# Patient Record
Sex: FEMALE | Race: BLACK OR AFRICAN AMERICAN | Employment: OTHER | URBAN - METROPOLITAN AREA
[De-identification: names, ages, dates, MRNs, and addresses within clinical notes are randomized per-mention and may not be internally consistent; named-entity substitution may affect disease eponyms.]

---

## 2017-04-26 ENCOUNTER — HOSPITAL ENCOUNTER (OUTPATIENT)
Dept: MAMMOGRAPHY | Age: 67
Discharge: HOME | End: 2017-04-26
Attending: OBSTETRICS & GYNECOLOGY
Payer: COMMERCIAL

## 2017-04-26 DIAGNOSIS — Z12.31 VISIT FOR SCREENING MAMMOGRAM: ICD-10-CM

## 2017-04-26 PROCEDURE — 77067 SCR MAMMO BI INCL CAD: CPT

## 2017-06-05 ENCOUNTER — HOSPITAL ENCOUNTER (OUTPATIENT)
Dept: PHYSICAL THERAPY | Age: 67
Discharge: HOME | End: 2017-06-05

## 2017-06-05 ENCOUNTER — HOSPITAL ENCOUNTER (OUTPATIENT)
Dept: PREADMISSION TESTING | Age: 67
Discharge: HOME | End: 2017-06-05
Payer: COMMERCIAL

## 2017-06-05 VITALS
TEMPERATURE: 98.1 F | HEIGHT: 64 IN | HEART RATE: 70 BPM | WEIGHT: 141.6 LBS | BODY MASS INDEX: 24.17 KG/M2 | OXYGEN SATURATION: 98 % | RESPIRATION RATE: 16 BRPM | DIASTOLIC BLOOD PRESSURE: 58 MMHG | SYSTOLIC BLOOD PRESSURE: 135 MMHG

## 2017-06-05 LAB
ALBUMIN SERPL BCP-MCNC: 4.1 G/DL (ref 3.5–5)
ALBUMIN/GLOB SERPL: 1.1 {RATIO} (ref 1.1–2.2)
ALP SERPL-CCNC: 64 U/L (ref 45–117)
ALT SERPL-CCNC: 33 U/L (ref 12–78)
ANION GAP BLD CALC-SCNC: 5 MMOL/L (ref 5–15)
APPEARANCE UR: CLEAR
AST SERPL W P-5'-P-CCNC: 20 U/L (ref 15–37)
ATRIAL RATE: 63 BPM
BACTERIA URNS QL MICRO: NEGATIVE /HPF
BILIRUB SERPL-MCNC: 0.3 MG/DL (ref 0.2–1)
BILIRUB UR QL: NEGATIVE
BUN SERPL-MCNC: 13 MG/DL (ref 6–20)
BUN/CREAT SERPL: 19 (ref 12–20)
CALCIUM SERPL-MCNC: 9.8 MG/DL (ref 8.5–10.1)
CALCULATED P AXIS, ECG09: 57 DEGREES
CALCULATED R AXIS, ECG10: 31 DEGREES
CALCULATED T AXIS, ECG11: 51 DEGREES
CHLORIDE SERPL-SCNC: 104 MMOL/L (ref 97–108)
CO2 SERPL-SCNC: 31 MMOL/L (ref 21–32)
COLOR UR: NORMAL
CREAT SERPL-MCNC: 0.69 MG/DL (ref 0.55–1.02)
DIAGNOSIS, 93000: NORMAL
EPITH CASTS URNS QL MICRO: NORMAL /LPF
ERYTHROCYTE [DISTWIDTH] IN BLOOD BY AUTOMATED COUNT: 13.9 % (ref 11.5–14.5)
EST. AVERAGE GLUCOSE BLD GHB EST-MCNC: 151 MG/DL
GLOBULIN SER CALC-MCNC: 3.8 G/DL (ref 2–4)
GLUCOSE SERPL-MCNC: 98 MG/DL (ref 65–100)
GLUCOSE UR STRIP.AUTO-MCNC: NEGATIVE MG/DL
HBA1C MFR BLD: 6.9 % (ref 4.2–6.3)
HCT VFR BLD AUTO: 36.6 % (ref 35–47)
HGB BLD-MCNC: 11.9 G/DL (ref 11.5–16)
HGB UR QL STRIP: NEGATIVE
HYALINE CASTS URNS QL MICRO: NORMAL /LPF (ref 0–5)
INR PPP: 1 (ref 0.9–1.1)
KETONES UR QL STRIP.AUTO: NEGATIVE MG/DL
LEUKOCYTE ESTERASE UR QL STRIP.AUTO: NEGATIVE
MCH RBC QN AUTO: 28.8 PG (ref 26–34)
MCHC RBC AUTO-ENTMCNC: 32.5 G/DL (ref 30–36.5)
MCV RBC AUTO: 88.6 FL (ref 80–99)
NITRITE UR QL STRIP.AUTO: NEGATIVE
P-R INTERVAL, ECG05: 158 MS
PH UR STRIP: 7.5 [PH] (ref 5–8)
PLATELET # BLD AUTO: 300 K/UL (ref 150–400)
POTASSIUM SERPL-SCNC: 3.1 MMOL/L (ref 3.5–5.1)
PROT SERPL-MCNC: 7.9 G/DL (ref 6.4–8.2)
PROT UR STRIP-MCNC: NEGATIVE MG/DL
PROTHROMBIN TIME: 10.2 SEC (ref 9–11.1)
Q-T INTERVAL, ECG07: 388 MS
QRS DURATION, ECG06: 80 MS
QTC CALCULATION (BEZET), ECG08: 397 MS
RBC # BLD AUTO: 4.13 M/UL (ref 3.8–5.2)
RBC #/AREA URNS HPF: NORMAL /HPF (ref 0–5)
SODIUM SERPL-SCNC: 140 MMOL/L (ref 136–145)
SP GR UR REFRACTOMETRY: 1.02 (ref 1–1.03)
UA: UC IF INDICATED,UAUC: NORMAL
UROBILINOGEN UR QL STRIP.AUTO: 0.2 EU/DL (ref 0.2–1)
VENTRICULAR RATE, ECG03: 63 BPM
WBC # BLD AUTO: 6.8 K/UL (ref 3.6–11)
WBC URNS QL MICRO: NORMAL /HPF (ref 0–4)

## 2017-06-05 PROCEDURE — 93005 ELECTROCARDIOGRAM TRACING: CPT

## 2017-06-05 PROCEDURE — 80053 COMPREHEN METABOLIC PANEL: CPT | Performed by: ORTHOPAEDIC SURGERY

## 2017-06-05 PROCEDURE — 81001 URINALYSIS AUTO W/SCOPE: CPT | Performed by: ORTHOPAEDIC SURGERY

## 2017-06-05 PROCEDURE — G8979 MOBILITY GOAL STATUS: HCPCS

## 2017-06-05 PROCEDURE — G8978 MOBILITY CURRENT STATUS: HCPCS

## 2017-06-05 PROCEDURE — 83036 HEMOGLOBIN GLYCOSYLATED A1C: CPT | Performed by: ORTHOPAEDIC SURGERY

## 2017-06-05 PROCEDURE — 36415 COLL VENOUS BLD VENIPUNCTURE: CPT | Performed by: ORTHOPAEDIC SURGERY

## 2017-06-05 PROCEDURE — 85027 COMPLETE CBC AUTOMATED: CPT | Performed by: ORTHOPAEDIC SURGERY

## 2017-06-05 PROCEDURE — 97161 PT EVAL LOW COMPLEX 20 MIN: CPT

## 2017-06-05 PROCEDURE — 97110 THERAPEUTIC EXERCISES: CPT

## 2017-06-05 PROCEDURE — G8980 MOBILITY D/C STATUS: HCPCS

## 2017-06-05 PROCEDURE — 85610 PROTHROMBIN TIME: CPT | Performed by: ORTHOPAEDIC SURGERY

## 2017-06-05 RX ORDER — SODIUM CHLORIDE, SODIUM LACTATE, POTASSIUM CHLORIDE, CALCIUM CHLORIDE 600; 310; 30; 20 MG/100ML; MG/100ML; MG/100ML; MG/100ML
25 INJECTION, SOLUTION INTRAVENOUS CONTINUOUS
Status: CANCELLED | OUTPATIENT
Start: 2017-06-20

## 2017-06-05 RX ORDER — CEFAZOLIN SODIUM IN 0.9 % NACL 2 G/100 ML
2 PLASTIC BAG, INJECTION (ML) INTRAVENOUS ONCE
Status: CANCELLED | OUTPATIENT
Start: 2017-06-20 | End: 2017-06-20

## 2017-06-05 RX ORDER — PREGABALIN 75 MG/1
75 CAPSULE ORAL ONCE
Status: CANCELLED | OUTPATIENT
Start: 2017-06-20 | End: 2017-06-20

## 2017-06-05 RX ORDER — ACETAMINOPHEN 325 MG/1
650 TABLET ORAL ONCE
Status: CANCELLED | OUTPATIENT
Start: 2017-06-05 | End: 2017-06-06

## 2017-06-05 RX ORDER — CELECOXIB 200 MG/1
200 CAPSULE ORAL ONCE
Status: CANCELLED | OUTPATIENT
Start: 2017-06-20 | End: 2017-06-20

## 2017-06-05 NOTE — PROGRESS NOTES
[facility]  Physical Therapy Pre-surgery evaluation  [address] [facility] [place], [address] [facility]    physical Therapy pre TKR surgery EVALUATION  Patient: Arleth Willett (89 y.o. female)  Date: 6/5/2017  Primary Diagnosis: rt total knee        Precautions:        ASSESSMENT :  Based on the objective data described below, the patient presents with impaired gait, balance, pain, and overall high level functional mobility due to end stage degenerative joint disease in the right knee. Pt reports independence w/ ambulation, ADLs, and denies history of falls. Hx of L partial knee replacement 3 yrs ago. Discussed anticipated disposition to home with possible discharge within a 1 to 2 day time frame post-surgery. Patient and  in agreement. Daughter present today however pt states that she will be discharging to rehab facility and has already visited facility/set this up. Pt states she lives alone and does not have any family who could assist.     GOALS: (Goals have been discussed and agreed upon with patient.)  DISCHARGE GOALS: Time Frame: 1 DAY  1. Patient will demonstrate increased strength, range of motion, and pain control via a home exercise program in order to minimize functional deficits in preparation for their upcoming surgery. This will be achieved by using education, demonstration and through the use of an informational handout including a home exercise program.  REHABILITATION POTENTIAL FOR STATED GOALS: Good     RECOMMENDATIONS AND PLANNED INTERVENTIONS: (Benefits and precautions of physical therapy have been discussed with the patient.)  1. Home Exercise Program  TREATMENT PLAN EFFECTIVE DATES: 6/5/2017 TO 6/5/2017  FREQUENCY/DURATION: Patient to continue to perform home exercise program at least twice daily until her surgery. SUBJECTIVE:   Patient stated I've already checked out the [facility] in [place].     OBJECTIVE DATA SUMMARY:   HISTORY:    Past Medical History:   Diagnosis Date    Arthritis     Chronic kidney disease     Stones    Diabetes (HCC)     NIDDM    GERD (gastroesophageal reflux disease)     Hypertension     Ill-defined condition     Elevated lipids    Other ill-defined conditions     colon polyps    Other ill-defined conditions     high cholesterol    Thyroid disease     HYPERTHYROID     Past Surgical History:   Procedure Laterality Date    BREAST SURGERY PROCEDURE UNLISTED      right breast cyst removed    HX BREAST BIOPSY Right 10 years ago     cyst removed    HX GYN      partial hysterectomy    HX HEENT      cyst on parathyroid removed    HX ORTHOPAEDIC      bilateral corpal tunnel release    HX ORTHOPAEDIC  2014    Partial Knee Replacement     Prior Level of Function/Home Situation: Independent w/ ambulation, ADLs, and denies history of falls. Lives alone. Still driving. Retired. Hx of L partial knee replacements. Personal factors and/or comorbidities impacting plan of care:     Patient []   does  [x]   does not state signs/symptoms of shortness of breath/dyspnea on exertion/respiratory distress. Home Situation  Home Environment: Private residence  # Steps to Enter: 4  Rails to Enter: Yes  Hand Rails : Bilateral  One/Two Story Residence: One story  Living Alone: Yes  Support Systems: Family member(s)  Patient Expects to be Discharged to[de-identified] Rehabilitation facility  Current DME Used/Available at Home: Glucometer, Crutches  Tub or Shower Type: Tub/Shower combination    EXAMINATION/PRESENTATION/DECISION MAKING:     ADLs (Current Functional Status):    Bathing/Showering:   [x] Independent  [] Requires Assistance from Someone  [] Sponge Bath Only   Ambulation:  [x] Independent  [] Walk Indoors Only  [] Walk Outdoors  [] Use Assistive Device  [] Use Wheelchair Only     Dressing:  [x] Independent    Requires Assistance from Someone for:  [] Sock/Shoes  [] Pants  [] Everything   Household Activities:  [x] Routine house and yard work  [] Light Housework Only  [] None       Critical Behavior:                Strength: Tone & Sensation:   Tone: Normal              Sensation: Intact               Range Of Motion:  AROM: Within functional limits                       Coordination:  Coordination: Within functional limits    Functional Mobility:  Transfers:  Sit to Stand: Independent  Stand to Sit: Independent                       Balance:   Sitting: Intact  Standing: Intact  Ambulation/Gait Training:  Distance (ft): 40 Feet (ft)     Ambulation - Level of Assistance: Independent        Gait Abnormalities: Decreased step clearance                                    Independent   Therapeutic Exercises:   The patient was educated in, has demonstrated, and has received written instructions to complete for their home exercise program per total knee replacement protocol. Functional Measure:  Lower Extremity Functional Scale (LEFS):      Score 70/80     Percentage of impairment CH  0% CI  1-19% CJ  20-39% CK  40-59% CL  60-79% CM  80-99% CN  100%   LEFS score:  0-80 80 64-79 47-63 31-46 16-30 1-15 0     Cutt-offs: None established  TKA and YE:   (Roya et al, 2000)  MDC = 9 points   MCID = 9 points           G codes: In compliance with CMSs Claims Based Outcome Reporting, the following G-code set was chosen for this patient based on their primary functional limitation being treated: The outcome measure chosen to determine the severity of the functional limitation was the LEFS with a score of 70/80 which was correlated with the impairment scale.     ? Mobility - Walking and Moving Around:     - CURRENT STATUS: CI - 1%-19% impaired, limited or restricted    - GOAL STATUS: CI - 1%-19% impaired, limited or restricted    - D/C STATUS:  CI - 1%-19% impaired, limited or restricted     Pain:  Pain Scale 1: Numeric (0 - 10)  Pain Intensity 1: 0                Activity Tolerance:   VSS on RA   Patient []   does  [x]   does not demonstrate signs/symptoms of shortness of breath/dyspnea on exertion/respiratory distress. COMMUNICATION/EDUCATION:   The patient was educated on:  [x]         Importance of post-operative mobility to achieve their desired outcomes and restore biological function  [x]         The key post-operative time frame to address ROM to prevent additional complications    The patients plan of care was discussed with:   [x]         The patient verbalized understanding of her plan in preparation for their upcoming surgery  [x]         The patient's  was present for this session  []         The patient reports that he/she does not have a  identified at this time  [x]         The  verbalized understanding of the education regarding the patient's upcoming surgery  [x]         Patient/family agree to work toward stated goals and plan of care. []         Patient understands intent and goals of therapy, but is neutral about his/her participation. []         Patient is unable to participate in goal setting and plan of care.     Thank you for this referral.  Edwin Aviles, PT, DPT   Time Calculation: 18 mins

## 2017-06-05 NOTE — PERIOP NOTES
[facility]  Preoperative Instructions        Surgery Date 6-          Time of Arrival 9:00 AM    1. On the day of your surgery, please report to the Surgical Services Registration Desk and sign in at your designated time. The Surgery Center is located to the right of the Emergency Room. 2. You must have someone with you to drive you home. You should not drive a car for 24 hours following surgery. Please make arrangements for a friend or family member to stay with you for the first 24 hours after your surgery. 3. Do not have anything to eat or drink (including water, gum, mints, coffee, juice) after midnight 6-              . This may not apply to medications prescribed by your physician. Please note special instructions, if applicable. If you are currently taking Plavix, Coumadin, or other blood-thinning agents, contact your surgeon for instructions. 4. We recommend you do not drink any alcoholic beverages for 24 hours before and after your surgery. 5. Have a list of all current medications, including vitamins, herbal supplements and any other over the counter medications. Stop all Aspirin and non-steroidal anti-inflammatory drugs (I.e. Advil, Aleve), as directed by your surgeon's office. Stop all vitamins and herbal supplements seven days prior to your surgery. 6. Wear comfortable clothes. Wear glasses instead of contacts. Do not bring any money or jewelry. Please bring picture ID, insurance card, and any prearranged co-payment or hospital payment. Do not wear make-up, particularly mascara the morning of your surgery. Do not wear nail polish, particularly if you are having foot /hand surgery. Wear your hair loose or down, no ponytails, buns, estelle pins or clips. All body piercings must be removed.   Please shower with antibacterial soap for three consecutive days before and on the morning of surgery, but do not apply any lotions, powders or deodorants after the shower on the day of surgery. Please use a fresh towels after each shower. Please sleep in clean clothes and change bed linens the night before surgery. Please do not shave for 48 hours prior to surgery. Shaving of the face is acceptable. 7. You should understand that if you do not follow these instructions your surgery may be cancelled. If your physical condition changes (I.e. fever, cold or flu) please contact your surgeon as soon as possible. 8. It is important that you be on time. If a situation occurs where you may be late, please call [phone] (OR Holding Area). 9. If you have any questions and or problems, please call [phone] (Pre-admission Testing). 10. Your surgery time may be subject to change. You will receive a phone call the evening prior if your time changes. 11.  If having outpatient surgery, you must have someone to drive you here, stay with you during the duration of your stay, and to drive you home at time of discharge. Special Instructions: Stop all Vitamins and vitamin supplements 7 days prior to surgery    MEDICATIONS TO TAKE THE MORNING OF SURGERY WITH A SIP OF WATER:Levothyroxine       I understand a pre-operative phone call will be made to verify my surgery time. In the event that I am not available, I give permission for a message to be left on my answering service and/or with another person?   Yes [phone]         ___________________      __________   _________    (Signature of Patient)             (Witness)                (Date and Time)

## 2017-06-06 LAB
BACTERIA SPEC CULT: NORMAL
BACTERIA SPEC CULT: NORMAL
SERVICE CMNT-IMP: NORMAL

## 2017-06-20 ENCOUNTER — ANESTHESIA EVENT (OUTPATIENT)
Dept: SURGERY | Age: 67
DRG: 470 | End: 2017-06-20
Payer: COMMERCIAL

## 2017-06-20 ENCOUNTER — ANESTHESIA (OUTPATIENT)
Dept: SURGERY | Age: 67
DRG: 470 | End: 2017-06-20
Payer: COMMERCIAL

## 2017-06-20 ENCOUNTER — HOSPITAL ENCOUNTER (INPATIENT)
Age: 67
LOS: 2 days | Discharge: HOME HEALTH | DRG: 470 | End: 2017-06-22
Attending: ORTHOPAEDIC SURGERY | Admitting: ORTHOPAEDIC SURGERY
Payer: COMMERCIAL

## 2017-06-20 PROBLEM — M17.11 OSTEOARTHRITIS OF RIGHT KNEE: Status: ACTIVE | Noted: 2017-06-20

## 2017-06-20 LAB
ABO + RH BLD: NORMAL
ANION GAP BLD CALC-SCNC: 18 MMOL/L (ref 5–15)
BLOOD GROUP ANTIBODIES SERPL: NORMAL
BUN BLD-MCNC: 21 MG/DL (ref 9–20)
CA-I BLD-MCNC: 1.26 MMOL/L (ref 1.12–1.32)
CHLORIDE BLD-SCNC: 105 MMOL/L (ref 98–107)
CO2 BLD-SCNC: 23 MMOL/L (ref 21–32)
CREAT BLD-MCNC: 0.6 MG/DL (ref 0.6–1.3)
GLUCOSE BLD STRIP.AUTO-MCNC: 147 MG/DL (ref 65–100)
GLUCOSE BLD STRIP.AUTO-MCNC: 156 MG/DL (ref 65–100)
GLUCOSE BLD STRIP.AUTO-MCNC: 182 MG/DL (ref 65–100)
GLUCOSE BLD-MCNC: 115 MG/DL (ref 65–100)
HCT VFR BLD CALC: 37 % (ref 35–47)
HGB BLD-MCNC: 12.6 GM/DL (ref 11.5–16)
POTASSIUM BLD-SCNC: 3.2 MMOL/L (ref 3.5–5.1)
SERVICE CMNT-IMP: ABNORMAL
SODIUM BLD-SCNC: 142 MMOL/L (ref 136–145)
SPECIMEN EXP DATE BLD: NORMAL

## 2017-06-20 PROCEDURE — 74011636637 HC RX REV CODE- 636/637: Performed by: PHYSICIAN ASSISTANT

## 2017-06-20 PROCEDURE — 77030034479 HC ADH SKN CLSR PRINEO J&J -B: Performed by: ORTHOPAEDIC SURGERY

## 2017-06-20 PROCEDURE — 77030033138 HC SUT PGA STRATFX J&J -B: Performed by: ORTHOPAEDIC SURGERY

## 2017-06-20 PROCEDURE — 82962 GLUCOSE BLOOD TEST: CPT

## 2017-06-20 PROCEDURE — 77030019908 HC STETH ESOPH SIMS -A: Performed by: NURSE ANESTHETIST, CERTIFIED REGISTERED

## 2017-06-20 PROCEDURE — 97161 PT EVAL LOW COMPLEX 20 MIN: CPT

## 2017-06-20 PROCEDURE — 77030003029 HC SUT VCRL J&J -B: Performed by: ORTHOPAEDIC SURGERY

## 2017-06-20 PROCEDURE — C1776 JOINT DEVICE (IMPLANTABLE): HCPCS | Performed by: ORTHOPAEDIC SURGERY

## 2017-06-20 PROCEDURE — 74011250637 HC RX REV CODE- 250/637: Performed by: NURSE PRACTITIONER

## 2017-06-20 PROCEDURE — 77030008684 HC TU ET CUF COVD -B: Performed by: NURSE ANESTHETIST, CERTIFIED REGISTERED

## 2017-06-20 PROCEDURE — 36415 COLL VENOUS BLD VENIPUNCTURE: CPT | Performed by: ORTHOPAEDIC SURGERY

## 2017-06-20 PROCEDURE — 74011250637 HC RX REV CODE- 250/637: Performed by: PHYSICIAN ASSISTANT

## 2017-06-20 PROCEDURE — 74011250636 HC RX REV CODE- 250/636: Performed by: ANESTHESIOLOGY

## 2017-06-20 PROCEDURE — 77030020788: Performed by: ORTHOPAEDIC SURGERY

## 2017-06-20 PROCEDURE — 80047 BASIC METABLC PNL IONIZED CA: CPT

## 2017-06-20 PROCEDURE — 74011250636 HC RX REV CODE- 250/636

## 2017-06-20 PROCEDURE — 77030011640 HC PAD GRND REM COVD -A: Performed by: ORTHOPAEDIC SURGERY

## 2017-06-20 PROCEDURE — 74011250636 HC RX REV CODE- 250/636: Performed by: PHYSICIAN ASSISTANT

## 2017-06-20 PROCEDURE — 74011250637 HC RX REV CODE- 250/637: Performed by: ORTHOPAEDIC SURGERY

## 2017-06-20 PROCEDURE — 76010000162 HC OR TIME 1.5 TO 2 HR INTENSV-TIER 1: Performed by: ORTHOPAEDIC SURGERY

## 2017-06-20 PROCEDURE — 76210000016 HC OR PH I REC 1 TO 1.5 HR: Performed by: ORTHOPAEDIC SURGERY

## 2017-06-20 PROCEDURE — 77030026438 HC STYL ET INTUB CARD -A: Performed by: NURSE ANESTHETIST, CERTIFIED REGISTERED

## 2017-06-20 PROCEDURE — 77030018836 HC SOL IRR NACL ICUM -A: Performed by: ORTHOPAEDIC SURGERY

## 2017-06-20 PROCEDURE — 77030016547 HC BLD SAW SAG1 STRY -B: Performed by: ORTHOPAEDIC SURGERY

## 2017-06-20 PROCEDURE — 74011250636 HC RX REV CODE- 250/636: Performed by: ORTHOPAEDIC SURGERY

## 2017-06-20 PROCEDURE — 77030018846 HC SOL IRR STRL H20 ICUM -A: Performed by: ORTHOPAEDIC SURGERY

## 2017-06-20 PROCEDURE — 74011000250 HC RX REV CODE- 250

## 2017-06-20 PROCEDURE — 74011000272 HC RX REV CODE- 272: Performed by: ORTHOPAEDIC SURGERY

## 2017-06-20 PROCEDURE — 77030032490 HC SLV COMPR SCD KNE COVD -B: Performed by: ORTHOPAEDIC SURGERY

## 2017-06-20 PROCEDURE — 86900 BLOOD TYPING SEROLOGIC ABO: CPT | Performed by: ORTHOPAEDIC SURGERY

## 2017-06-20 PROCEDURE — 76060000034 HC ANESTHESIA 1.5 TO 2 HR: Performed by: ORTHOPAEDIC SURGERY

## 2017-06-20 PROCEDURE — 8E0YXBZ COMPUTER ASSISTED PROCEDURE OF LOWER EXTREMITY: ICD-10-PCS | Performed by: ORTHOPAEDIC SURGERY

## 2017-06-20 PROCEDURE — 77030003028 HC SUT VCRL J&J -A: Performed by: ORTHOPAEDIC SURGERY

## 2017-06-20 PROCEDURE — 74011000250 HC RX REV CODE- 250: Performed by: ORTHOPAEDIC SURGERY

## 2017-06-20 PROCEDURE — 65270000029 HC RM PRIVATE

## 2017-06-20 PROCEDURE — 77030000032 HC CUF TRNQT ZIMM -B: Performed by: ORTHOPAEDIC SURGERY

## 2017-06-20 PROCEDURE — 77030028907 HC WRP KNEE WO BGS SOLM -B

## 2017-06-20 PROCEDURE — 0SRC0JA REPLACEMENT OF RIGHT KNEE JOINT WITH SYNTHETIC SUBSTITUTE, UNCEMENTED, OPEN APPROACH: ICD-10-PCS | Performed by: ORTHOPAEDIC SURGERY

## 2017-06-20 DEVICE — COMPONENT TOT KNEE HYBRID POROUS X3 TRIATHLON: Type: IMPLANTABLE DEVICE | Status: FUNCTIONAL

## 2017-06-20 DEVICE — INSERT TIB ARTC BRNG SZ3 9MM -- TRIATHLON: Type: IMPLANTABLE DEVICE | Site: KNEE | Status: FUNCTIONAL

## 2017-06-20 DEVICE — COMPNT FEM CR TRIATHLN 3 R PA --: Type: IMPLANTABLE DEVICE | Site: KNEE | Status: FUNCTIONAL

## 2017-06-20 DEVICE — BASEPLATE TIB SZ 3 AP44MM ML67MM KNEE TRITANIUM 4 CRUCFRM: Type: IMPLANTABLE DEVICE | Site: KNEE | Status: FUNCTIONAL

## 2017-06-20 DEVICE — COMPONENT PATELLAR ASYM 32X10 MM SUP INFERIOR KNEE BEAD: Type: IMPLANTABLE DEVICE | Site: KNEE | Status: FUNCTIONAL

## 2017-06-20 RX ORDER — DEXAMETHASONE SODIUM PHOSPHATE 4 MG/ML
10 INJECTION, SOLUTION INTRA-ARTICULAR; INTRALESIONAL; INTRAMUSCULAR; INTRAVENOUS; SOFT TISSUE ONCE
Status: COMPLETED | OUTPATIENT
Start: 2017-06-21 | End: 2017-06-21

## 2017-06-20 RX ORDER — NALOXONE HYDROCHLORIDE 0.4 MG/ML
0.4 INJECTION, SOLUTION INTRAMUSCULAR; INTRAVENOUS; SUBCUTANEOUS AS NEEDED
Status: DISCONTINUED | OUTPATIENT
Start: 2017-06-20 | End: 2017-06-22 | Stop reason: HOSPADM

## 2017-06-20 RX ORDER — FACIAL-BODY WIPES
10 EACH TOPICAL DAILY PRN
Status: DISCONTINUED | OUTPATIENT
Start: 2017-06-22 | End: 2017-06-22 | Stop reason: HOSPADM

## 2017-06-20 RX ORDER — SUCCINYLCHOLINE CHLORIDE 20 MG/ML
INJECTION INTRAMUSCULAR; INTRAVENOUS AS NEEDED
Status: DISCONTINUED | OUTPATIENT
Start: 2017-06-20 | End: 2017-06-20 | Stop reason: HOSPADM

## 2017-06-20 RX ORDER — HYDROMORPHONE HYDROCHLORIDE 1 MG/ML
0.5 INJECTION, SOLUTION INTRAMUSCULAR; INTRAVENOUS; SUBCUTANEOUS
Status: ACTIVE | OUTPATIENT
Start: 2017-06-20 | End: 2017-06-21

## 2017-06-20 RX ORDER — ONDANSETRON 2 MG/ML
4 INJECTION INTRAMUSCULAR; INTRAVENOUS AS NEEDED
Status: DISCONTINUED | OUTPATIENT
Start: 2017-06-20 | End: 2017-06-20 | Stop reason: HOSPADM

## 2017-06-20 RX ORDER — FAMOTIDINE 20 MG/1
20 TABLET, FILM COATED ORAL 2 TIMES DAILY
Status: DISCONTINUED | OUTPATIENT
Start: 2017-06-20 | End: 2017-06-22 | Stop reason: HOSPADM

## 2017-06-20 RX ORDER — LIDOCAINE HYDROCHLORIDE 20 MG/ML
INJECTION, SOLUTION EPIDURAL; INFILTRATION; INTRACAUDAL; PERINEURAL AS NEEDED
Status: DISCONTINUED | OUTPATIENT
Start: 2017-06-20 | End: 2017-06-20 | Stop reason: HOSPADM

## 2017-06-20 RX ORDER — DEXAMETHASONE SODIUM PHOSPHATE 4 MG/ML
INJECTION, SOLUTION INTRA-ARTICULAR; INTRALESIONAL; INTRAMUSCULAR; INTRAVENOUS; SOFT TISSUE AS NEEDED
Status: DISCONTINUED | OUTPATIENT
Start: 2017-06-20 | End: 2017-06-20 | Stop reason: HOSPADM

## 2017-06-20 RX ORDER — PHENYLEPHRINE HCL IN 0.9% NACL 0.4MG/10ML
SYRINGE (ML) INTRAVENOUS AS NEEDED
Status: DISCONTINUED | OUTPATIENT
Start: 2017-06-20 | End: 2017-06-20 | Stop reason: HOSPADM

## 2017-06-20 RX ORDER — SODIUM CHLORIDE 0.9 % (FLUSH) 0.9 %
5-10 SYRINGE (ML) INJECTION AS NEEDED
Status: DISCONTINUED | OUTPATIENT
Start: 2017-06-20 | End: 2017-06-20 | Stop reason: HOSPADM

## 2017-06-20 RX ORDER — INSULIN LISPRO 100 [IU]/ML
INJECTION, SOLUTION INTRAVENOUS; SUBCUTANEOUS
Status: DISCONTINUED | OUTPATIENT
Start: 2017-06-20 | End: 2017-06-22 | Stop reason: HOSPADM

## 2017-06-20 RX ORDER — POLYETHYLENE GLYCOL 3350 17 G/17G
17 POWDER, FOR SOLUTION ORAL DAILY
Status: DISCONTINUED | OUTPATIENT
Start: 2017-06-21 | End: 2017-06-22 | Stop reason: HOSPADM

## 2017-06-20 RX ORDER — MIDAZOLAM HYDROCHLORIDE 1 MG/ML
INJECTION, SOLUTION INTRAMUSCULAR; INTRAVENOUS AS NEEDED
Status: DISCONTINUED | OUTPATIENT
Start: 2017-06-20 | End: 2017-06-20 | Stop reason: HOSPADM

## 2017-06-20 RX ORDER — SODIUM CHLORIDE, SODIUM LACTATE, POTASSIUM CHLORIDE, CALCIUM CHLORIDE 600; 310; 30; 20 MG/100ML; MG/100ML; MG/100ML; MG/100ML
25 INJECTION, SOLUTION INTRAVENOUS CONTINUOUS
Status: DISCONTINUED | OUTPATIENT
Start: 2017-06-20 | End: 2017-06-20 | Stop reason: HOSPADM

## 2017-06-20 RX ORDER — HYDROMORPHONE HYDROCHLORIDE 1 MG/ML
.2-.5 INJECTION, SOLUTION INTRAMUSCULAR; INTRAVENOUS; SUBCUTANEOUS
Status: DISCONTINUED | OUTPATIENT
Start: 2017-06-20 | End: 2017-06-20 | Stop reason: HOSPADM

## 2017-06-20 RX ORDER — SCOLOPAMINE TRANSDERMAL SYSTEM 1 MG/1
1.5 PATCH, EXTENDED RELEASE TRANSDERMAL
Status: DISCONTINUED | OUTPATIENT
Start: 2017-06-20 | End: 2017-06-22 | Stop reason: HOSPADM

## 2017-06-20 RX ORDER — ACETAMINOPHEN 325 MG/1
650 TABLET ORAL EVERY 6 HOURS
Status: DISCONTINUED | OUTPATIENT
Start: 2017-06-20 | End: 2017-06-22 | Stop reason: HOSPADM

## 2017-06-20 RX ORDER — NEOSTIGMINE METHYLSULFATE 1 MG/ML
INJECTION INTRAVENOUS AS NEEDED
Status: DISCONTINUED | OUTPATIENT
Start: 2017-06-20 | End: 2017-06-20 | Stop reason: HOSPADM

## 2017-06-20 RX ORDER — MIDAZOLAM HYDROCHLORIDE 1 MG/ML
0.5 INJECTION, SOLUTION INTRAMUSCULAR; INTRAVENOUS
Status: DISCONTINUED | OUTPATIENT
Start: 2017-06-20 | End: 2017-06-20 | Stop reason: HOSPADM

## 2017-06-20 RX ORDER — SODIUM CHLORIDE 9 MG/ML
125 INJECTION, SOLUTION INTRAVENOUS CONTINUOUS
Status: DISPENSED | OUTPATIENT
Start: 2017-06-20 | End: 2017-06-21

## 2017-06-20 RX ORDER — SODIUM CHLORIDE 0.9 % (FLUSH) 0.9 %
5-10 SYRINGE (ML) INJECTION EVERY 8 HOURS
Status: DISCONTINUED | OUTPATIENT
Start: 2017-06-21 | End: 2017-06-22 | Stop reason: HOSPADM

## 2017-06-20 RX ORDER — LEVOTHYROXINE SODIUM 100 UG/1
100 TABLET ORAL
Status: DISCONTINUED | OUTPATIENT
Start: 2017-06-21 | End: 2017-06-22 | Stop reason: HOSPADM

## 2017-06-20 RX ORDER — RANITIDINE 150 MG/1
150 TABLET, FILM COATED ORAL
COMMUNITY
End: 2020-10-21

## 2017-06-20 RX ORDER — DIPHENHYDRAMINE HCL 12.5MG/5ML
12.5 ELIXIR ORAL
Status: DISPENSED | OUTPATIENT
Start: 2017-06-20 | End: 2017-06-21

## 2017-06-20 RX ORDER — LIDOCAINE HYDROCHLORIDE 10 MG/ML
0.1 INJECTION, SOLUTION EPIDURAL; INFILTRATION; INTRACAUDAL; PERINEURAL AS NEEDED
Status: DISCONTINUED | OUTPATIENT
Start: 2017-06-20 | End: 2017-06-20 | Stop reason: HOSPADM

## 2017-06-20 RX ORDER — ASPIRIN 325 MG
325 TABLET, DELAYED RELEASE (ENTERIC COATED) ORAL 2 TIMES DAILY
Status: DISCONTINUED | OUTPATIENT
Start: 2017-06-20 | End: 2017-06-22 | Stop reason: HOSPADM

## 2017-06-20 RX ORDER — ACETAMINOPHEN 325 MG/1
650 TABLET ORAL ONCE
Status: COMPLETED | OUTPATIENT
Start: 2017-06-20 | End: 2017-06-20

## 2017-06-20 RX ORDER — AMOXICILLIN 250 MG
1 CAPSULE ORAL 2 TIMES DAILY
Status: DISCONTINUED | OUTPATIENT
Start: 2017-06-20 | End: 2017-06-22 | Stop reason: HOSPADM

## 2017-06-20 RX ORDER — ONDANSETRON 2 MG/ML
4 INJECTION INTRAMUSCULAR; INTRAVENOUS
Status: DISPENSED | OUTPATIENT
Start: 2017-06-20 | End: 2017-06-21

## 2017-06-20 RX ORDER — DIPHENHYDRAMINE HYDROCHLORIDE 50 MG/ML
12.5 INJECTION, SOLUTION INTRAMUSCULAR; INTRAVENOUS AS NEEDED
Status: DISCONTINUED | OUTPATIENT
Start: 2017-06-20 | End: 2017-06-20 | Stop reason: HOSPADM

## 2017-06-20 RX ORDER — FENTANYL CITRATE 50 UG/ML
INJECTION, SOLUTION INTRAMUSCULAR; INTRAVENOUS AS NEEDED
Status: DISCONTINUED | OUTPATIENT
Start: 2017-06-20 | End: 2017-06-20 | Stop reason: HOSPADM

## 2017-06-20 RX ORDER — HYDROMORPHONE HYDROCHLORIDE 2 MG/ML
INJECTION, SOLUTION INTRAMUSCULAR; INTRAVENOUS; SUBCUTANEOUS AS NEEDED
Status: DISCONTINUED | OUTPATIENT
Start: 2017-06-20 | End: 2017-06-20 | Stop reason: HOSPADM

## 2017-06-20 RX ORDER — DEXTROSE 50 % IN WATER (D50W) INTRAVENOUS SYRINGE
12.5-25 AS NEEDED
Status: DISCONTINUED | OUTPATIENT
Start: 2017-06-20 | End: 2017-06-20 | Stop reason: SDUPTHER

## 2017-06-20 RX ORDER — HYDROMORPHONE HYDROCHLORIDE 2 MG/1
2 TABLET ORAL
Status: DISCONTINUED | OUTPATIENT
Start: 2017-06-20 | End: 2017-06-22 | Stop reason: HOSPADM

## 2017-06-20 RX ORDER — MAGNESIUM SULFATE 100 %
4 CRYSTALS MISCELLANEOUS AS NEEDED
Status: DISCONTINUED | OUTPATIENT
Start: 2017-06-20 | End: 2017-06-22 | Stop reason: HOSPADM

## 2017-06-20 RX ORDER — PREGABALIN 75 MG/1
75 CAPSULE ORAL ONCE
Status: COMPLETED | OUTPATIENT
Start: 2017-06-20 | End: 2017-06-20

## 2017-06-20 RX ORDER — OXYCODONE HYDROCHLORIDE 5 MG/1
5 TABLET ORAL
Status: DISCONTINUED | OUTPATIENT
Start: 2017-06-20 | End: 2017-06-20

## 2017-06-20 RX ORDER — MORPHINE SULFATE 10 MG/ML
2 INJECTION, SOLUTION INTRAMUSCULAR; INTRAVENOUS
Status: DISCONTINUED | OUTPATIENT
Start: 2017-06-20 | End: 2017-06-20 | Stop reason: HOSPADM

## 2017-06-20 RX ORDER — ROCURONIUM BROMIDE 10 MG/ML
INJECTION, SOLUTION INTRAVENOUS AS NEEDED
Status: DISCONTINUED | OUTPATIENT
Start: 2017-06-20 | End: 2017-06-20 | Stop reason: HOSPADM

## 2017-06-20 RX ORDER — OXYCODONE HYDROCHLORIDE 5 MG/1
10 TABLET ORAL
Status: DISCONTINUED | OUTPATIENT
Start: 2017-06-20 | End: 2017-06-20

## 2017-06-20 RX ORDER — CEFAZOLIN SODIUM IN 0.9 % NACL 2 G/100 ML
2 PLASTIC BAG, INJECTION (ML) INTRAVENOUS ONCE
Status: COMPLETED | OUTPATIENT
Start: 2017-06-20 | End: 2017-06-20

## 2017-06-20 RX ORDER — GLYCOPYRROLATE 0.2 MG/ML
INJECTION INTRAMUSCULAR; INTRAVENOUS AS NEEDED
Status: DISCONTINUED | OUTPATIENT
Start: 2017-06-20 | End: 2017-06-20 | Stop reason: HOSPADM

## 2017-06-20 RX ORDER — PRAVASTATIN SODIUM 40 MG/1
80 TABLET ORAL EVERY EVENING
Status: DISCONTINUED | OUTPATIENT
Start: 2017-06-20 | End: 2017-06-22 | Stop reason: HOSPADM

## 2017-06-20 RX ORDER — FENTANYL CITRATE 50 UG/ML
50 INJECTION, SOLUTION INTRAMUSCULAR; INTRAVENOUS AS NEEDED
Status: DISCONTINUED | OUTPATIENT
Start: 2017-06-20 | End: 2017-06-20 | Stop reason: HOSPADM

## 2017-06-20 RX ORDER — ONDANSETRON 2 MG/ML
INJECTION INTRAMUSCULAR; INTRAVENOUS AS NEEDED
Status: DISCONTINUED | OUTPATIENT
Start: 2017-06-20 | End: 2017-06-20 | Stop reason: HOSPADM

## 2017-06-20 RX ORDER — FENTANYL CITRATE 50 UG/ML
25 INJECTION, SOLUTION INTRAMUSCULAR; INTRAVENOUS
Status: DISCONTINUED | OUTPATIENT
Start: 2017-06-20 | End: 2017-06-20 | Stop reason: HOSPADM

## 2017-06-20 RX ORDER — SODIUM CHLORIDE 0.9 % (FLUSH) 0.9 %
5-10 SYRINGE (ML) INJECTION AS NEEDED
Status: DISCONTINUED | OUTPATIENT
Start: 2017-06-20 | End: 2017-06-22 | Stop reason: HOSPADM

## 2017-06-20 RX ORDER — HYDROMORPHONE HYDROCHLORIDE 2 MG/1
4 TABLET ORAL
Status: DISCONTINUED | OUTPATIENT
Start: 2017-06-20 | End: 2017-06-22 | Stop reason: HOSPADM

## 2017-06-20 RX ORDER — CELECOXIB 200 MG/1
200 CAPSULE ORAL ONCE
Status: COMPLETED | OUTPATIENT
Start: 2017-06-20 | End: 2017-06-20

## 2017-06-20 RX ORDER — CEFAZOLIN SODIUM IN 0.9 % NACL 2 G/100 ML
2 PLASTIC BAG, INJECTION (ML) INTRAVENOUS EVERY 8 HOURS
Status: COMPLETED | OUTPATIENT
Start: 2017-06-20 | End: 2017-06-21

## 2017-06-20 RX ORDER — PROPOFOL 10 MG/ML
INJECTION, EMULSION INTRAVENOUS AS NEEDED
Status: DISCONTINUED | OUTPATIENT
Start: 2017-06-20 | End: 2017-06-20 | Stop reason: HOSPADM

## 2017-06-20 RX ADMIN — SODIUM CHLORIDE, POTASSIUM CHLORIDE, SODIUM LACTATE AND CALCIUM CHLORIDE: 600; 310; 30; 20 INJECTION, SOLUTION INTRAVENOUS at 11:15

## 2017-06-20 RX ADMIN — CEFAZOLIN 2 G: 10 INJECTION, POWDER, FOR SOLUTION INTRAVENOUS; PARENTERAL at 19:32

## 2017-06-20 RX ADMIN — PROPOFOL 160 MG: 10 INJECTION, EMULSION INTRAVENOUS at 11:43

## 2017-06-20 RX ADMIN — NEOSTIGMINE METHYLSULFATE 2 MG: 1 INJECTION INTRAVENOUS at 13:08

## 2017-06-20 RX ADMIN — CELECOXIB 200 MG: 200 CAPSULE ORAL at 09:49

## 2017-06-20 RX ADMIN — ONDANSETRON HYDROCHLORIDE 4 MG: 2 INJECTION, SOLUTION INTRAMUSCULAR; INTRAVENOUS at 16:36

## 2017-06-20 RX ADMIN — CEFAZOLIN 2 G: 10 INJECTION, POWDER, FOR SOLUTION INTRAVENOUS; PARENTERAL at 11:49

## 2017-06-20 RX ADMIN — ROCURONIUM BROMIDE 25 MG: 10 INJECTION, SOLUTION INTRAVENOUS at 11:50

## 2017-06-20 RX ADMIN — SODIUM CHLORIDE 125 ML/HR: 900 INJECTION, SOLUTION INTRAVENOUS at 14:30

## 2017-06-20 RX ADMIN — FAMOTIDINE 20 MG: 20 TABLET, FILM COATED ORAL at 20:33

## 2017-06-20 RX ADMIN — HYDROMORPHONE HYDROCHLORIDE 4 MG: 2 TABLET ORAL at 20:33

## 2017-06-20 RX ADMIN — PREGABALIN 75 MG: 75 CAPSULE ORAL at 09:49

## 2017-06-20 RX ADMIN — FENTANYL CITRATE 50 MCG: 50 INJECTION, SOLUTION INTRAMUSCULAR; INTRAVENOUS at 12:05

## 2017-06-20 RX ADMIN — INSULIN LISPRO 2 UNITS: 100 INJECTION, SOLUTION INTRAVENOUS; SUBCUTANEOUS at 17:45

## 2017-06-20 RX ADMIN — HYDROMORPHONE HYDROCHLORIDE 4 MG: 2 TABLET ORAL at 16:44

## 2017-06-20 RX ADMIN — ACETAMINOPHEN 650 MG: 325 TABLET, FILM COATED ORAL at 17:46

## 2017-06-20 RX ADMIN — SODIUM CHLORIDE, POTASSIUM CHLORIDE, SODIUM LACTATE AND CALCIUM CHLORIDE: 600; 310; 30; 20 INJECTION, SOLUTION INTRAVENOUS at 13:19

## 2017-06-20 RX ADMIN — MIDAZOLAM HYDROCHLORIDE 2 MG: 1 INJECTION, SOLUTION INTRAMUSCULAR; INTRAVENOUS at 11:35

## 2017-06-20 RX ADMIN — SODIUM CHLORIDE 125 ML/HR: 900 INJECTION, SOLUTION INTRAVENOUS at 23:11

## 2017-06-20 RX ADMIN — ACETAMINOPHEN 650 MG: 325 TABLET, FILM COATED ORAL at 09:49

## 2017-06-20 RX ADMIN — ONDANSETRON 4 MG: 2 INJECTION INTRAMUSCULAR; INTRAVENOUS at 13:01

## 2017-06-20 RX ADMIN — DEXAMETHASONE SODIUM PHOSPHATE 10 MG: 4 INJECTION, SOLUTION INTRA-ARTICULAR; INTRALESIONAL; INTRAMUSCULAR; INTRAVENOUS; SOFT TISSUE at 12:03

## 2017-06-20 RX ADMIN — GLYCOPYRROLATE 0.4 MG: 0.2 INJECTION INTRAMUSCULAR; INTRAVENOUS at 13:08

## 2017-06-20 RX ADMIN — SUCCINYLCHOLINE CHLORIDE 140 MG: 20 INJECTION INTRAMUSCULAR; INTRAVENOUS at 11:43

## 2017-06-20 RX ADMIN — LIDOCAINE HYDROCHLORIDE 60 MG: 20 INJECTION, SOLUTION EPIDURAL; INFILTRATION; INTRACAUDAL; PERINEURAL at 11:43

## 2017-06-20 RX ADMIN — FENTANYL CITRATE 50 MCG: 50 INJECTION, SOLUTION INTRAMUSCULAR; INTRAVENOUS at 11:43

## 2017-06-20 RX ADMIN — Medication 80 MCG: at 12:01

## 2017-06-20 RX ADMIN — PRAVASTATIN SODIUM 80 MG: 40 TABLET ORAL at 17:46

## 2017-06-20 RX ADMIN — HYDROMORPHONE HYDROCHLORIDE 1 MG: 2 INJECTION, SOLUTION INTRAMUSCULAR; INTRAVENOUS; SUBCUTANEOUS at 12:11

## 2017-06-20 RX ADMIN — DOCUSATE SODIUM AND SENNOSIDES 1 TABLET: 8.6; 5 TABLET, FILM COATED ORAL at 17:46

## 2017-06-20 RX ADMIN — ACETAMINOPHEN 650 MG: 325 TABLET, FILM COATED ORAL at 23:36

## 2017-06-20 RX ADMIN — REGULAR STRENGTH 325 MG: 325 TABLET ORAL at 17:46

## 2017-06-20 RX ADMIN — ROPIVACAINE HYDROCHLORIDE 100 ML: 5 INJECTION, SOLUTION EPIDURAL; INFILTRATION; PERINEURAL at 12:50

## 2017-06-20 RX ADMIN — ROCURONIUM BROMIDE 5 MG: 10 INJECTION, SOLUTION INTRAVENOUS at 11:43

## 2017-06-20 NOTE — OP NOTES
[address], [facility]   OP NOTE       Name:  Mesha Carbajal   MR#:  [id number]   :  1950   Account #:  [de-identified]    Surgery Date:  2017   Date of Adm:  2017       PREOPERATIVE DIAGNOSIS: Right knee severe end-stage   osteoarthritis. POSTOPERATIVE DIAGNOSIS: Right knee severe end-stage   osteoarthritis. PROCEDURES PERFORMED: Right total knee arthroplasty with   navigation. SURGEON: Gustabo Brasher. Augustus Kaba MD.    ANESTHESIA: General.    COMPLICATIONS: None. PHYSICIANS ASSISTANT: Matilda Jaimes.    ESTIMATED BLOOD LOSS: 200 mL. SPECIMENS REMOVED: None. COMPLICATIONS: None. INDICATIONS: This is a 45-year-old female who failed conservative   management for right knee severe medial compartment osteoarthritis. Her pain had progressed to the point that daily activities are affected. The risks, benefits, and alternatives of total knee arthroplasty were   explained in detail and she agreed to proceed. DESCRIPTION OF PROCEDURE: The patient was taken to the   operating room, laid supine on the table. General anesthetic was   administered. Ancef was given preoperatively per protocol. A   tourniquet was applied to the right thigh above the knee. The knee was   then prepped and draped free in the usual sterile fashion and time-out   was called. Following this, a medial parapatellar approach was utilized   with a mid vastus release. The tourniquet was not inflated. A small   medial release was carried out along the proximal tibia. The fat pad   was resected, and then the knee was brought into extension, lateral   patellofemoral ligament was incised and the patella was everted and   towel clips were used to expose the patella and 10 mm was resected   off the undersurface. A metal protection plate was then placed and the   patella was put back into the noneverted position and swept laterally.    The knee was flexed and retractors were placed along the medial and   lateral gutters. Distal femoral tracker was pinned in place, the   appropriate data points entered into the computer, and a distal femoral   cut was carried out at 9 mm from the high side, neutral varus valgus   angulation about 5 degrees posterior of flexion. Following this, a   chamfer guide was placed for a size 3 femur and 4 degrees of external   rotation referencing the posterior condyles. Anterior and posterior   chamfer cuts were then carried out as well. Following this, the ACL was resected, the tibia was subluxed forward,   and the tracker was pinned on the tibia, and the appropriate data   points entered into the computer. The proximal tibial cut was carried   out at 8 mm from the high side, 5 from the low neutral varus-valgus   angulation in 3 degrees posterior slope. The knee was split into 90   degrees of flexion and a laminar  was placed medially and   laterally, and the medial and lateral menisci were removed along with a   posterior release carried out, and the posterior capsule was cauterized   and also infiltrated with transient casted as well as Marcaine with   epinephrine, Toradol, and morphine. The knee was then trialed with a   3 femur, a 3 tibia with a 9 cruciate substituting insert with excellent   stability and full range of motion achieved. The lug holes were drilled   for the femur. The tibial tray was pinned in place, then the femoral   component was removed, as was the insert, and a punch was used to   prepare for a press-fit tibia. The raw bone surfaces were irrigated and   then a size 3 Triathlon press-fit tibia was impacted into position, and a   final size 9 CS X3 insert was clicked into the tibia and then a size 3   press-fit femur was impacted onto the end of the femur. The knee was   brought out into extension. Patella was everted and 3 drill holes were   placed for a 32 press-fit patella, which was compressed into place. There was good patellofemoral tracking, full range of motion of the   knee with good stability. A total of 3 liters pulsatile lavage was then   irrigated through the joint and the knee was brought into light flexion   and extensor mechanism was closed with a running #2 Stratafix suture   and a total of 16 mL of the Marcaine with epinephrine, Toradol and   morphine mix was injected deep to the fascia and 40 mL superficially. The remainder of the [address] staple was also injected deep to the fascia as   well. The skin was then closed with a self-retaining 2-0 and a followed   up with Dermabond skin closure. Sterile dressing was applied. The   patient tolerated the procedure well and was stable to recovery room in   satisfactory condition without complication.         MD LOIS Guzman / Isidro Navarro   D:  06/20/2017   13:49   T:  06/20/2017   14:31   Job #:  [id number]

## 2017-06-20 NOTE — PROGRESS NOTES
Ortho/ NeuroSurgery NP Note    POD# 0  s/p RIGHT TOTAL KNEE ARTHROPLASTY WITH NAVIGATION     Pt resting in bed. No complaints. Denies pain currently. A&O x4   VSS Afebrile. Patient has had something to eat. Reports nausea. Previous knee surgery did not tolerate Oxycodone due to nausea. Changed to Dilaudid. Most Recent Labs:   Lab Results   Component Value Date/Time    HGB 11.9 06/05/2017 01:13 PM    Hemoglobin A1c 6.9 06/05/2017 01:13 PM     [facility] Summary : No concerns        MRSA Screen Pre-op Negative  U/A Screen Pre-Op Negative    Body mass index is 23.99 kg/(m^2). BMI greater than 30 is classified as obesity and greater than 40 is classified as morbid obesity. STOP BANG Score: 3    Social History: No significant history. Huang out. Patient is voiding in adequate amounts. Dressing c.d.i    Calves soft and supple; No pain with passive stretch  Sensation and motor intact  SCDs for mechanical DVT proph    Plan:  1) PT BID starting today  2) Lizbeth-op Antibiotics Ancef  3) Aspirin 325 mg PO BID for DVT Prophylaxis   4) Discharge plans to \"rehab\" according to patient. Patient would not cite who could be home with her.      Gifty Atwood NP

## 2017-06-20 NOTE — ROUTINE PROCESS
1311: Patient: Kevin Adams MRN: [id number]  SSN: [id number]   YOB: 1950  Age: 61 y.o. Sex: female     Patient is status post Procedure(s):  RIGHT TOTAL KNEE ARTHROPLASTY WITH NAVIGATION. Surgeon(s) and Role:     * Angeli Artis MD - Primary    Local/Dose/Irrigation:  DR. BONILLA SOLUTION                  Peripheral IV 06/20/17 Left Wrist (Active)   Site Assessment Clean, dry, & intact 6/20/2017  9:46 AM   Phlebitis Assessment 0 6/20/2017  9:46 AM   Infiltration Assessment 0 6/20/2017  9:46 AM   Dressing Status Clean, dry, & intact 6/20/2017  9:46 AM   Dressing Type Transparent;Tape 6/20/2017  9:46 AM   Hub Color/Line Status Pink; Infusing 6/20/2017  9:46 AM   Action Taken Blood drawn 6/20/2017  9:46 AM            Airway - Endotracheal Tube 06/20/17 Oral (Active)   Line Newton Lips 6/20/2017 12:00 AM                   Dressing/Packing:  Wound Knee Right-DRESSING TYPE: Cast padding;Elastic bandage; Topical skin adhesive/glue (DERMABOND PRINEO) (06/20/17 1310)  Splint/Cast:  ]    Other:

## 2017-06-20 NOTE — PROGRESS NOTES
Problem: Mobility Impaired (Adult and Pediatric)  Goal: *Acute Goals and Plan of Care (Insert Text)  Physical Therapy Goals  Initiated 6/20/2017    1. Patient will move from supine to sit and sit to supine , scoot up and down and roll side to side in bed with independence within 4 days. 2. Patient will perform sit to stand with independence within 4 days. 3. Patient will ambulate with independence for 500 feet with the least restrictive device within 4 days. 4. Patient will ascend/descend 12 stairs with single handrail(s) with independence within 4 days. 5. Patient will perform home exercise program per protocol with independence within 4 days. 6. Patient will demonstrate AROM 0-90 degrees in operative joint within 4 days. PHYSICAL THERAPY KNEE EVALUATION  Patient: Keri Fraser (27 y.o. female)  Date: 6/20/2017  Primary Diagnosis: RIGHT KNEE OA  Osteoarthritis of right knee  Procedure(s) (LRB):  RIGHT TOTAL KNEE ARTHROPLASTY WITH NAVIGATION (Right) Day of Surgery   Precautions:  WBAT      ASSESSMENT :  Based on the objective data described below, the patient presents with nausea (but no vomiting), lethargy, pain, decreased functional mobility and RLE strength s/p R TKR. Patient I PTA with history of L partial knee replacement. Received this date in supine, hardly agreeable stating 'too early since surgery' despite medical stability at time of visit. She was limited in large part by nausea, without vomiting, only able to stand to side-step back to [facility] with RW and CGA. Patient states she will attend rehab at NH as she lacks home support, despite daughter being present at time of visit. .     Patient will benefit from skilled intervention to address the above impairments.   Patients rehabilitation potential is considered to be Good  Factors which may influence rehabilitation potential include:   [ ]         None noted  [ ]         Mental ability/status  [ ]         Medical condition  [X]         Home/family situation and support systems  [ ]         Safety awareness  [ ]         Pain tolerance/management  [ ]         Other:        PLAN :  Recommendations and Planned Interventions:  [X]           Bed Mobility Training             [ ]    Neuromuscular Re-Education  [X]           Transfer Training                   [ ]    Orthotic/Prosthetic Training  [X]           Gait Training                         [ ]    Modalities  [X]           Therapeutic Exercises           [ ]    Edema Management/Control  [X]           Therapeutic Activities            [X]    Patient and Family Training/Education  [ ]           Other (comment):     Frequency/Duration: Patient will be followed by physical therapy twice daily to address goals. Discharge Recommendations: patient requesting (what appears to be SNF) rehab  Further Equipment Recommendations for Discharge: defer - she owns a RW        SUBJECTIVE:   Patient stated I can't do this now.       OBJECTIVE DATA SUMMARY:   HISTORY:    Past Medical History:   Diagnosis Date    Arthritis      Diabetes ([facility] [address]       NIDDM    GERD (gastroesophageal reflux disease)      Hyperlipemia      Hypertension      Kidney stones      Other ill-defined conditions       colon polyps    Other ill-defined conditions       high cholesterol    Thyroid disease       HYPERTHYROID     Past Surgical History:   Procedure Laterality Date    BREAST SURGERY PROCEDURE UNLISTED         right breast cyst removed    HX BREAST BIOPSY Right 10 years ago      cyst removed    HX GYN         partial hysterectomy    HX HEENT         cyst on parathyroid removed    HX ORTHOPAEDIC Bilateral       bilateral carpal tunnel release    HX ORTHOPAEDIC Left 2014     Partial Knee Replacement     Prior Level of Function/Home Situation: Independent, retired, no falls in the past year, driving, prior L uni knee which went well per her report. Plans for rehab as she lives alone; daughter present during session.    Personal factors and/or comorbidities impacting plan of care:            EXAMINATION/PRESENTATION/DECISION MAKING:   Critical Behavior:  Neurologic State: Drowsy  Orientation Level: Oriented X4  Cognition: Appropriate decision making  Safety/Judgement: Awareness of environment  Hearing: Auditory  Auditory Impairment: None  Hearing Aids/Status: Does not own  Range Of Motion:  AROM: Within functional limits (gen dec RLE )                       Strength:    Strength: Within functional limits (gen dec RLE )                    Tone & Sensation:   Tone: Normal              Sensation: Intact               Coordination:  Coordination: Within functional limits  Vision:   Corrective Lenses: Glasses  Functional Mobility:  Bed Mobility:  Rolling: Supervision  Supine to Sit: Supervision  Sit to Supine: Supervision  Scooting: Supervision  Transfers:  Sit to Stand: Contact guard assistance  Stand to Sit: Contact guard assistance                       Balance:   Sitting: Intact; Without support  Standing: Impaired; With support (of RW)  Standing - Static: Good;Constant support  Standing - Dynamic : Fair  Ambulation/Gait Training:  Distance (ft): 3 Feet (ft)  Assistive Device: Walker, rolling;Gait belt  Ambulation - Level of Assistance: Contact guard assistance     Gait Description (WDL): Exceptions to WDL  Gait Abnormalities: Decreased step clearance  Right Side Weight Bearing: As tolerated  Left Side Weight Bearing: Full                                         VC's to evenly weight RLE compared to LLE  Functional Measure:  Barthel Index:      Bathin  Bladder: 10  Bowels: 10  Groomin  Dressin  Feeding: 10  Mobility: 0  Stairs: 0  Toilet Use: 5  Transfer (Bed to Chair and Back): 10  Total: 55         Barthel and G-code impairment scale:  Percentage of impairment CH  0% CI  1-19% CJ  20-39% CK  40-59% CL  60-79% CM  80-99% CN  100%   Barthel Score 0-100 100 99-80 79-60 59-40 20-39 1-19    0   Barthel Score 0-20 20 17-19 13-16 9-12 5-8 1-4 0      The Barthel ADL Index: Guidelines  1. The index should be used as a record of what a patient does, not as a record of what a patient could do. 2. The main aim is to establish degree of independence from any help, physical or verbal, however minor and for whatever reason. 3. The need for supervision renders the patient not independent. 4. A patient's performance should be established using the best available evidence. Asking the patient, friends/relatives and nurses are the usual sources, but direct observation and common sense are also important. However direct testing is not needed. 5. Usually the patient's performance over the preceding 24-48 hours is important, but occasionally longer periods will be relevant. 6. Middle categories imply that the patient supplies over 50 per cent of the effort. 7. Use of aids to be independent is allowed. Johanne Segura., Barthel, D.W. ([id number]). Functional evaluation: the Barthel Index. [address] [facility] (14)2. LUCY Gomes, Roman Best., Silvia Rogers., [place], [address] [place] [address] (1999). Measuring the change indisability after inpatient rehabilitation; comparison of the responsiveness of the Barthel Index and Functional [place] Measure. Journal of Neurology, Neurosurgery, and Psychiatry, 66(4), 669-123. Linda Bueno, N.J.A, TEVIN Jack, & Pino Fulton M.A. (2004.) Assessment of post-stroke quality of life in cost-effectiveness studies: The usefulness of the Barthel Index and the EuroQoL-5D. Quality of Life Research, 13, 905-06            G codes: In compliance with CMSs Claims Based Outcome Reporting, the following G-code set was chosen for this patient based on their primary functional limitation being treated: The outcome measure chosen to determine the severity of the functional limitation was the Barthel with a score of 55/100 which was correlated with the impairment scale.       · Mobility - Walking and Moving Around:               - CURRENT STATUS:    CK - 40%-59% impaired, limited or restricted               - GOAL STATUS:           CI - 1%-19% impaired, limited or restricted               - D/C STATUS:                       ---------------To be determined---------------            Pain:  Pain Scale 1: Numeric (0 - 10)  Pain Intensity 1: 2              Activity Tolerance:   Nausea limiting; VSS      Please refer to the flowsheet for vital signs taken during this treatment. After treatment:   [ ]         Patient left in no apparent distress sitting up in chair  [X]         Patient left in no apparent distress in bed  [X]         Call bell left within reach  [X]         Nursing notified  [X]         Caregiver present  [ ]         Bed alarm activated      COMMUNICATION/EDUCATION:   The patients plan of care was discussed with: Registered Nurse.  [X]         Fall prevention education was provided and the patient/caregiver indicated understanding. [X]         Patient/family have participated as able in goal setting and plan of care. [X]         Patient/family agree to work toward stated goals and plan of care. [ ]         Patient understands intent and goals of therapy, but is neutral about his/her participation. [ ]         Patient is unable to participate in goal setting and plan of care.      Thank you for this referral.  Margo Ortiz, PT, DPT    Time Calculation: 10 mins

## 2017-06-20 NOTE — H&P
ORTHO PRE-OP HISTORY AND PHYSICAL    Subjective:     Patient is a 61 y.o.  female presented with a history of right knee pain. Onset of symptoms was gradual with gradually worsening course since that time. She is being admitted for surgical management of this condition. The indications for the procedure include right knee end stage osteoarthritis unresponsive to conservative treatment and affecting daily living. Patient Active Problem List    Diagnosis Date Noted    Osteoarthritis of right knee 06/20/2017    Left knee DJD 01/09/2015     Past Medical History:   Diagnosis Date    Arthritis     Diabetes ([address]     NIDDM    GERD (gastroesophageal reflux disease)     Hyperlipemia     Hypertension     Kidney stones     Other ill-defined conditions     colon polyps    Other ill-defined conditions     high cholesterol    Thyroid disease     HYPERTHYROID      Past Surgical History:   Procedure Laterality Date    BREAST SURGERY PROCEDURE UNLISTED      right breast cyst removed    HX BREAST BIOPSY Right 10 years ago     cyst removed    HX GYN      partial hysterectomy    HX HEENT      cyst on parathyroid removed    HX ORTHOPAEDIC Bilateral     bilateral carpal tunnel release    HX ORTHOPAEDIC Left 2014    Partial Knee Replacement      Prior to Admission medications    Medication Sig Start Date End Date Taking? Authorizing Provider   raNITIdine (ZANTAC) 150 mg tablet Take 150 mg by mouth two (2) times a day. Yes Historical Provider   calcium-cholecalciferol, d3, (CALCIUM 600 + D) 600-125 mg-unit tab Take 2 tablets by mouth daily. Yes Historical Provider   B.infantis-B.ani-B.long-B.bifi (PROBIOTIC 4X) 10-15 mg TbEC Take 1 capsule by mouth daily. Yes Historical Provider   amlodipine-valsartan (EXFORGE)  mg per tablet Take 1 Tab by mouth daily. Yes Historical Provider   metformin (GLUCOPHAGE) 500 mg tablet Take 500 mg by mouth daily (with breakfast).    Yes Historical Provider triamterene-hydrochlorothiazide (DYAZIDE) 37.5-25 mg per capsule Take 0.5 capsules by mouth daily. Yes Historical Provider   levothyroxine (SYNTHROID) 100 mcg tablet Take  by mouth Daily (before breakfast). Yes Historical Provider   simvastatin (ZOCOR) 40 mg tablet Take 40 mg by mouth nightly. Yes Historical Provider   ranitidine hcl 150 mg capsule Take 150 mg by mouth daily. Historical Provider     No Known Allergies   Social History   Substance Use Topics    Smoking status: Former Smoker     Packs/day: 0.50     Years: 3.00     Quit date: 1/1/1991    Smokeless tobacco: Never Used    Alcohol use No      Family History   Problem Relation Age of Onset    Heart Disease Mother     Heart Disease Father      MI    Asthma Father     Anesth Problems Neg Hx       Review of Systems  A comprehensive review of systems was negative except for that written in the HPI. Objective:     Patient Vitals for the past 8 hrs:   BP Temp Pulse Resp SpO2 Height Weight   06/20/17 0926 159/56 99.2 °F (37.3 °C) 67 18 99 % 5' 4\" (1.626 m) 63.4 kg (139 lb 12.4 oz)     Right knee tender to rom, nvi, hip nt    Imaging Review  Right knee severe osteoarthritis    Assessment:     Active Problems:    Osteoarthritis of right knee (6/20/2017)        Plan:     The various methods of treatment have been discussed with the patient and family. After consideration of risks, benefits and other options for treatment, the patient has consented to surgical interventions (right total knee arthroplsaty). Questions were answered and Pre-op teaching was done by staff.

## 2017-06-20 NOTE — ANESTHESIA POSTPROCEDURE EVALUATION
Post-Anesthesia Evaluation and Assessment    Patient: Savanah Emerson MRN: [id number]  SSN: [id number]    YOB: 1950  Age: 61 y.o. Sex: female       Cardiovascular Function/Vital Signs  Visit Vitals    /59    Pulse 87    Temp 37.1 °C (98.8 °F)    Resp 19    Ht 5' 4\" (1.626 m)    Wt 63.4 kg (139 lb 12.4 oz)    SpO2 96%    BMI 23.99 kg/m2       Patient is status post general anesthesia for Procedure(s):  RIGHT TOTAL KNEE ARTHROPLASTY WITH NAVIGATION. Nausea/Vomiting: None    Postoperative hydration reviewed and adequate. Pain:  Pain Scale 1: Numeric (0 - 10) (06/20/17 1430)  Pain Intensity 1: 2 (06/20/17 1430)   Managed    Neurological Status:   Neuro (WDL): Exceptions to WDL (06/20/17 1350)  Neuro  Neurologic State: Drowsy; Eyes open to voice (06/20/17 1350)  Orientation Level: Oriented to person;Oriented to situation (06/20/17 1350)  Cognition: Decreased attention/concentration; Follows commands (06/20/17 1350)  Speech: Delayed responses; Appropriate for age (06/20/17 1350)  LUE Motor Response: Purposeful (06/20/17 1350)  LLE Motor Response: Purposeful (06/20/17 1350)  RUE Motor Response: Purposeful (06/20/17 1350)  RLE Motor Response: Purposeful (06/20/17 1350)   At baseline    Mental Status and Level of Consciousness: Arousable    Pulmonary Status:   O2 Device: Room air (06/20/17 1430)   Adequate oxygenation and airway patent    Complications related to anesthesia: None    Post-anesthesia assessment completed.  No concerns    Signed By: Monty Peralta MD     June 20, 2017

## 2017-06-20 NOTE — IP AVS SNAPSHOT
Current Discharge Medication List  
  
START taking these medications Dose & Instructions Dispensing Information Comments Morning Noon Evening Bedtime  
 acetaminophen 325 mg tablet Commonly known as:  TYLENOL Your last dose was: Your next dose is:    
   
   
 Dose:  650 mg Take 2 Tabs by mouth every six (6) hours for 14 days. Quantity:  112 Tab Refills:  0  
     
   
   
   
  
 aspirin delayed-release 325 mg tablet Your last dose was: Your next dose is:    
   
   
 Dose:  325 mg Take 1 Tab by mouth two (2) times a day for 30 days. Quantity:  60 Tab Refills:  0 HYDROmorphone 2 mg tablet Commonly known as:  DILAUDID Your last dose was: Your next dose is:    
   
   
 Dose:  2-4 mg Take 1-2 Tabs by mouth every four (4) hours as needed. Max Daily Amount: 24 mg. Quantity:  80 Tab Refills:  0  
     
   
   
   
  
 polyethylene glycol 17 gram packet Commonly known as:  Seabron Cunning Your last dose was: Your next dose is:    
   
   
 Dose:  17 g Take 1 Packet by mouth daily as needed (constipation) for up to 15 days. Quantity:  15 Packet Refills:  0  
     
   
   
   
  
 senna-docusate 8.6-50 mg per tablet Commonly known as:  Lorice Moaunging Your last dose was: Your next dose is:    
   
   
 Dose:  1 Tab Take 1 Tab by mouth daily. Quantity:  30 Tab Refills:  0 CONTINUE these medications which have NOT CHANGED Dose & Instructions Dispensing Information Comments Morning Noon Evening Bedtime CALCIUM 600 + D 600-125 mg-unit Tab Generic drug:  calcium-cholecalciferol (d3) Your last dose was: Your next dose is:    
   
   
 Dose:  2 Tab Take 2 tablets by mouth daily. Refills:  0 EXFORGE  mg per tablet Generic drug:  amLODIPine-valsartan Your last dose was: Your next dose is:    
   
   
 Dose:  1 Tab Take 1 Tab by mouth daily. Refills:  0  
     
   
   
   
  
 metFORMIN 500 mg tablet Commonly known as:  GLUCOPHAGE Your last dose was: Your next dose is:    
   
   
 Dose:  500 mg Take 500 mg by mouth daily (with breakfast). Refills:  0 PROBIOTIC 4X 10-15 mg Tbec Generic drug:  B.infantis-B.ani-B.long-B.bifi Your last dose was: Your next dose is:    
   
   
 Dose:  1 Cap Take 1 capsule by mouth daily. Refills:  0  
     
   
   
   
  
 * raNITIdine hcl 150 mg capsule Your last dose was: Your next dose is:    
   
   
 Dose:  150 mg Take 150 mg by mouth daily. Refills:  0  
     
   
   
   
  
 * ZANTAC 150 mg tablet Generic drug:  raNITIdine Your last dose was: Your next dose is:    
   
   
 Dose:  150 mg Take 150 mg by mouth two (2) times a day. Refills:  0  
     
   
   
   
  
 simvastatin 40 mg tablet Commonly known as:  ZOCOR Your last dose was: Your next dose is:    
   
   
 Dose:  40 mg Take 40 mg by mouth nightly. Refills:  0  
     
   
   
   
  
 SYNTHROID 100 mcg tablet Generic drug:  levothyroxine Your last dose was: Your next dose is: Take  by mouth Daily (before breakfast). Refills:  0  
     
   
   
   
  
 triamterene-hydroCHLOROthiazide 37.5-25 mg per capsule Commonly known as:  Romayne Tristen Your last dose was: Your next dose is:    
   
   
 Dose:  0.5 Cap Take 0.5 capsules by mouth daily. Refills:  0  
     
   
   
   
  
 * Notice: This list has 2 medication(s) that are the same as other medications prescribed for you. Read the directions carefully, and ask your doctor or other care provider to review them with you. Where to Get Your Medications Information on where to get these meds will be given to you by the nurse or doctor. ! Ask your nurse or doctor about these medications  
  acetaminophen 325 mg tablet  
 aspirin delayed-release 325 mg tablet HYDROmorphone 2 mg tablet  
 polyethylene glycol 17 gram packet  
 senna-docusate 8.6-50 mg per tablet

## 2017-06-20 NOTE — PERIOP NOTES
9276:  Dr. Delfino Dave at bedside    3775:  Patient's daughter at her bedside. She took her own Prilosec with scheduled preop medicines per Dr. Delfino Dave. 1005:  Lab phoned type & screen hemolyzed.   Labs redrawn in one attempt from [facility] [place]    1026:  Patient assisted OOB to void

## 2017-06-20 NOTE — IP AVS SNAPSHOT
[address] [facility] 
[phone] Patient: Gene Mckeon MRN: [id number] YFZ:[birth year] You are allergic to the following No active allergies Recent Documentation Height Weight Breastfeeding? BMI OB Status Smoking Status 1.626 m 69 kg No 26.13 kg/m2 Hysterectomy Former Smoker Emergency Contacts Name Discharge Info Relation Home Work Mobile Christelle Becerril DISCHARGE CAREGIVER [3] Child [2] [phone] Karen Velasquez  Daughter [21]   [phone] About your hospitalization You were admitted on:  June 20, 2017 You last received care in the:  [facility] You were discharged on:  June 22, 2017 Unit phone number:  [phone] Why you were hospitalized Your primary diagnosis was:  Osteoarthritis Of Right Knee Providers Seen During Your Hospitalizations Provider Role Specialty Primary office phone Ronni Darnell MD Attending Provider Orthopedic Surgery [phone] Your Primary Care Physician (PCP) Primary Care Physician Office Phone Office Fax Marizol Swanson [phone] Follow-up Information Follow up With Details Comments Contact Info AT HOME CARE On 6/21/2017 This is the provider of your home health needs. If you do not hear from them within 24 hours post discharge, please give them a call. [address] [place] [address] 
[phone] Bin Yap MD   [address] [place] [address] 
[phone] Ronni Darnell MD In 2 weeks  [facility] [address] [facility] 
[phone] Current Discharge Medication List  
  
START taking these medications Dose & Instructions Dispensing Information Comments Morning Noon Evening Bedtime  
 acetaminophen 325 mg tablet Commonly known as:  TYLENOL  
   
 Your last dose was: Your next dose is:    
   
   
 Dose:  650 mg Take 2 Tabs by mouth every six (6) hours for 14 days. Quantity:  112 Tab Refills:  0  
     
   
   
   
  
 aspirin delayed-release 325 mg tablet Your last dose was: Your next dose is:    
   
   
 Dose:  325 mg Take 1 Tab by mouth two (2) times a day for 30 days. Quantity:  60 Tab Refills:  0 HYDROmorphone 2 mg tablet Commonly known as:  DILAUDID Your last dose was: Your next dose is:    
   
   
 Dose:  2-4 mg Take 1-2 Tabs by mouth every four (4) hours as needed. Max Daily Amount: 24 mg. Quantity:  80 Tab Refills:  0  
     
   
   
   
  
 polyethylene glycol 17 gram packet Commonly known as:  Shari Rudi Your last dose was: Your next dose is:    
   
   
 Dose:  17 g Take 1 Packet by mouth daily as needed (constipation) for up to 15 days. Quantity:  15 Packet Refills:  0  
     
   
   
   
  
 senna-docusate 8.6-50 mg per tablet Commonly known as:  Elizabeth Parham Your last dose was: Your next dose is:    
   
   
 Dose:  1 Tab Take 1 Tab by mouth daily. Quantity:  30 Tab Refills:  0 CONTINUE these medications which have NOT CHANGED Dose & Instructions Dispensing Information Comments Morning Noon Evening Bedtime CALCIUM 600 + D 600-125 mg-unit Tab Generic drug:  calcium-cholecalciferol (d3) Your last dose was: Your next dose is:    
   
   
 Dose:  2 Tab Take 2 tablets by mouth daily. Refills:  0 EXFORGE  mg per tablet Generic drug:  amLODIPine-valsartan Your last dose was: Your next dose is:    
   
   
 Dose:  1 Tab Take 1 Tab by mouth daily. Refills:  0  
     
   
   
   
  
 metFORMIN 500 mg tablet Commonly known as:  GLUCOPHAGE Your last dose was:     
   
Your next dose is:    
   
   
 Dose:  500 mg  
 Take 500 mg by mouth daily (with breakfast). Refills:  0 PROBIOTIC 4X 10-15 mg Tbec Generic drug:  B.infantis-B.ani-B.long-B.bifi Your last dose was: Your next dose is:    
   
   
 Dose:  1 Cap Take 1 capsule by mouth daily. Refills:  0  
     
   
   
   
  
 * raNITIdine hcl 150 mg capsule Your last dose was: Your next dose is:    
   
   
 Dose:  150 mg Take 150 mg by mouth daily. Refills:  0  
     
   
   
   
  
 * ZANTAC 150 mg tablet Generic drug:  raNITIdine Your last dose was: Your next dose is:    
   
   
 Dose:  150 mg Take 150 mg by mouth two (2) times a day. Refills:  0  
     
   
   
   
  
 simvastatin 40 mg tablet Commonly known as:  ZOCOR Your last dose was: Your next dose is:    
   
   
 Dose:  40 mg Take 40 mg by mouth nightly. Refills:  0  
     
   
   
   
  
 SYNTHROID 100 mcg tablet Generic drug:  levothyroxine Your last dose was: Your next dose is: Take  by mouth Daily (before breakfast). Refills:  0  
     
   
   
   
  
 triamterene-hydroCHLOROthiazide 37.5-25 mg per capsule Commonly known as:  Leldon Mercury Your last dose was: Your next dose is:    
   
   
 Dose:  0.5 Cap Take 0.5 capsules by mouth daily. Refills:  0  
     
   
   
   
  
 * Notice: This list has 2 medication(s) that are the same as other medications prescribed for you. Read the directions carefully, and ask your doctor or other care provider to review them with you. Where to Get Your Medications Information on where to get these meds will be given to you by the nurse or doctor. ! Ask your nurse or doctor about these medications  
  acetaminophen 325 mg tablet  
 aspirin delayed-release 325 mg tablet HYDROmorphone 2 mg tablet  
 polyethylene glycol 17 gram packet  
 senna-docusate 8.6-50 mg per tablet Discharge Instructions Cortesdeniseabdifatah Agustin Surgery: Total Knee Replacement Surgeon:   Angelic Mane MD 
Surgery Date:  6/20/2017 ? To relieve pain: ? Use ice/gel packs. ? Put the ice pack directly over the wound, or anywhere you are hurting or swollen. ? To control pain and swelling, keep ice on regularly, especially after physical activity. ? The packs should stay cold for 3-4 hours. When it is not cold anymore, rotate with the packs in the freezer. ? Elevate your leg. This will also keep swelling down. ? Rest for at least 20 minutes between activity or exercises. ? To keep track of your pain medications, write down what you take and when you take it. ? The last dose of pain medication you got in the hospital was:    
 
Medication Dose Date & Time ? Choose your medications based on the pain scale below: ? To keep your pain under control, take Tylenol every 6 hours for 14 days - even if you feel like you dont need it. ? For mild to moderate pain (1-6 on pain scale), take one pain pill every 3-4 hours as needed. ? For severe pain (7-10 on pain scale), take two pain pills every 3-4 hours as needed. ? To prevent nausea, take your pain medications with food. Pain Scale ? As your pain lessens: 
 
? Slowly start taking less pain medication. You may do this by waiting longer between doses or by taking smaller doses. ? Stop using the pain medications as soon as you no longer need it, usually in 2-3 weeks. ? Aspirin ? To prevent blood clots, you will need to take Aspirin 325 mg twice a day for 30 days. ? To prevent stomach upset or bleeding: ? Do not take non-steroidal anti-inflammatory medications (Ibuprofen, Advil, Motrin, Naproxen, etc.) ? Take Pepcid 20 mg twice a day, or a similar home medication, while you are taking a blood thinner. OPSITE (Honeycomb dressing) ? Keep your clear, waterproof dressing in place for 5-7 days after your leave the hospital. 
 
? If you are still having drainage, you will need to change your dressing in 5-7 days. You will be given one extra dressing to use at home. ? If there is no more drainage from the wound, you may leave it open to the air. OPSITE DRESSING INSTRUCTIONS ? To increase and promote healing: 
? Stop Smoking (or at least cut back on 
     Smoking). ? Eat a well-balanced diet (high in protein 
     and vitamin C). ? If you have a poor appetite, drink Ensure, Glucerna, or  
      [place] Instant Breakfast for the next 30 days. ? If you are diabetic, you should control your blood  
      sugars to prevent infection and help your wound  
      to heal. 
               
 
 
 
? To prevent constipation, stay active and drink plenty of fluid. ? While using pain medications, you should also take stool softeners and laxatives, such as Pericolace 
     and Miralax. ? If you are having too many bowel Movements, then you may need 
     to stop taking the laxatives. ? You should have a bowel movement 3-4 days  
     after surgery and then at least every other day while 
     on pain medication. ? To improve your recovery, you must be active! ? Use your walker and take short walks (in your home)  
      about every 2 hours during the day. ? Try to increase how far you walk each day. ? You can put as much weight on your leg as you can tolerate while walking. ? To avoid getting a stiff knee, work on getting your knee bent and straight as soon as possible. ? Home health physical therapy will come to your 
      home a few times per week to teach you how to 
      get out of bed, to safely walk in your home, and 
      to do your exercises. ? NO DRIVING until your surgeon tells you it is ok. ? You can return to work when cleared by a physician. ? Please call your physician immediately if you have: 
 
? Constant bleeding from your wound. ? Increasing redness or swelling around your wound (Some warmth, bruising and swelling is normal). ? Change in wound drainage (increase in amount, color, or bad smell). ? Change in mental status (unusual behavior ? Temperature over 101.5 degrees Fahrenheit ? Pain or redness in the calf (back of your lower leg  see picture) ? Increased swelling of the thigh, ankle, calf, or foot. ? Emergency: CALL 911 if you have: 
 
? Shortness of breath ? Chest pain when you cough or taking a deep breath ? Please call your surgeons office at [phone]  for a follow up appointment. _ 
? You should call as soon as you get home or the next day after discharge. Ask to make an appointment in 2 weeks. ? If you have questions or concerns during normal business hours, you may reach Dr. Ramirez' Team at [phone]. Discharge Orders None [employer] Announcement We are excited to announce that we are making your provider's discharge notes available to you in [employer]. You will see these notes when they are completed and signed by the physician that discharged you from your recent hospital stay. If you have any questions or concerns about any information you see in [employer], please call the Health Information Department where you were seen or reach out to your Primary Care Provider for more information about your plan of care. Introducing [facility]! [employer] introduces [employer] patient portal. Now you can access parts of your medical record, email your doctor's office, and request medication refills online. 1. In your internet browser, go to [email or web address] 2. Click on the First Time User? Click Here link in the Sign In box. You will see the New Member Sign Up page. 3. Enter your [employer] Access Code exactly as it appears below. You will not need to use this code after youve completed the sign-up process. If you do not sign up before the expiration date, you must request a new code. · [employer] Access Code: [email or web address] Expires: 8/29/2017  2:07 PM 
 
4. Enter the last four digits of your Social Security Number (xxxx) and Date of Birth (mm/dd/yyyy) as indicated and click Submit. You will be taken to the next sign-up page. 5. Create a [employer] ID. This will be your [employer] login ID and cannot be changed, so think of one that is secure and easy to remember. 6. Create a [employer] password. You can change your password at any time. 7. Enter your Password Reset Question and Answer. This can be used at a later time if you forget your password. 8. Enter your e-mail address. You will receive e-mail notification when new information is available in [address]. 9. Click Sign Up. You can now view and download portions of your medical record. 10. Click the Download Summary menu link to download a portable copy of your medical information. If you have questions, please visit the Frequently Asked Questions section of the [employer] website. Remember, [employer] is NOT to be used for urgent needs. For medical emergencies, dial 911. Now available from your iPhone and Android! General Information Please provide this summary of care documentation to your next provider. Patient Signature:  ____________________________________________________________ Date:  ____________________________________________________________  
  
Cherri Leon Provider Signature:  ____________________________________________________________ Date:  ____________________________________________________________

## 2017-06-20 NOTE — ANESTHESIA PREPROCEDURE EVALUATION
Anesthetic History   No history of anesthetic complications            Review of Systems / Medical History  Patient summary reviewed, nursing notes reviewed and pertinent labs reviewed    Pulmonary                Comments: Former Smoker - 1.5 pack years   Neuro/Psych   Within defined limits           Cardiovascular    Hypertension          Hyperlipidemia    Exercise tolerance: >4 METS     GI/Hepatic/Renal     GERD    Renal disease: stones      Comments: Hx colon polyps Endo/Other    Diabetes  Hypothyroidism  Arthritis     Other Findings   Comments: Osteoarthritis  DJD         Physical Exam    Airway  Mallampati: III    Neck ROM: normal range of motion   Mouth opening: Normal     Cardiovascular  Regular rate and rhythm,  S1 and S2 normal,  no murmur, click, rub, or gallop             Dental      Comments: No loose teeth   Pulmonary  Breath sounds clear to auscultation               Abdominal  GI exam deferred       Other Findings            Anesthetic Plan    ASA: 2  Anesthesia type: general          Induction: Intravenous  Anesthetic plan and risks discussed with: Patient

## 2017-06-20 NOTE — PERIOP NOTES
TRANSFER - OUT REPORT:    Verbal report given to Neida Yuan RN(name) on Kwadwo Samaniego  being transferred to [facility] ([facility])) for routine post - op       Report consisted of patients Situation, Background, Assessment and   Recommendations(SBAR). Information from the following report(s) SBAR, OR Summary, Intake/Output, MAR and Recent Results was reviewed with the receiving nurse. Opportunity for questions and clarification was provided. Patient transported with:   Tech     Attempt to update daughter who is not in the [facility]. Devora Winston updated with transfer.

## 2017-06-20 NOTE — BRIEF OP NOTE
BRIEF OPERATIVE NOTE    Date of Procedure: 6/20/2017   Preoperative Diagnosis: RIGHT KNEE OA  Postoperative Diagnosis: RIGHT KNEE OA    Procedure(s):  RIGHT TOTAL KNEE ARTHROPLASTY WITH NAVIGATION  Surgeon(s) and Role:     * Michelle Marion MD - Primary         Assistant Staff:  Physician Assistant: ELIZABETH Sun    Surgical Staff:  Circ-1: Michelle Caballero RN  Circ-Relief: Vaughn Cheney RN  Physician Assistant: ELIZABETH Sun  Scrub Tech-1: Gabriel Salazar  Scrub Tech-2: Rod Young  Surg Asst-1: Robert Trujillo  Event Time In   Incision Start 1205   Incision Close 1319     Anesthesia: General   Estimated Blood Loss: 200cc  Specimens: * No specimens in log *   Findings: as above   Complications: none  Implants:   Implant Name Type Inv.  Item Serial No.  Lot No. LRB No. Used Action   COMPNT FEM CR TRIATHLN 3 R PA --  - [id number]  COMPNT FEM CR TRIATHLN 3 R PA --  [id number] [employer] HOW [id number] Right 1 Implanted   INSERT TIB ARTC BRNG SZ3 9MM -- TRIATHLON - [id number]  INSERT TIB ARTC BRNG SZ3 9MM -- TRIATHLON [id number] [employer] HOW [id number] Right 1 Implanted   BASEPLT TIB PC TRITNM SZ 3 -- TRIATHLON - [id number]  BASEPLT TIB PC TRITNM SZ 3 -- TRIATHLON [id number] [employer] HOW [id number] Right 1 Implanted   PAT W/PA COATING BEAD 30D08CT -- TRIATHLON - [id number]   PAT W/PA COATING BEAD 01C45UX -- TRIATHLON [id number] [employer] HOW [id number] Right 1 Implanted

## 2017-06-21 LAB
ANION GAP BLD CALC-SCNC: 6 MMOL/L (ref 5–15)
BUN SERPL-MCNC: 14 MG/DL (ref 6–20)
BUN/CREAT SERPL: 27 (ref 12–20)
CALCIUM SERPL-MCNC: 8.8 MG/DL (ref 8.5–10.1)
CHLORIDE SERPL-SCNC: 109 MMOL/L (ref 97–108)
CO2 SERPL-SCNC: 25 MMOL/L (ref 21–32)
CREAT SERPL-MCNC: 0.52 MG/DL (ref 0.55–1.02)
GLUCOSE BLD STRIP.AUTO-MCNC: 100 MG/DL (ref 65–100)
GLUCOSE BLD STRIP.AUTO-MCNC: 144 MG/DL (ref 65–100)
GLUCOSE BLD STRIP.AUTO-MCNC: 174 MG/DL (ref 65–100)
GLUCOSE BLD STRIP.AUTO-MCNC: 240 MG/DL (ref 65–100)
GLUCOSE BLD STRIP.AUTO-MCNC: 275 MG/DL (ref 65–100)
GLUCOSE SERPL-MCNC: 101 MG/DL (ref 65–100)
HGB BLD-MCNC: 9.8 G/DL (ref 11.5–16)
POTASSIUM SERPL-SCNC: 3.6 MMOL/L (ref 3.5–5.1)
SERVICE CMNT-IMP: ABNORMAL
SERVICE CMNT-IMP: NORMAL
SODIUM SERPL-SCNC: 140 MMOL/L (ref 136–145)

## 2017-06-21 PROCEDURE — 74011250636 HC RX REV CODE- 250/636: Performed by: PHYSICIAN ASSISTANT

## 2017-06-21 PROCEDURE — 82962 GLUCOSE BLOOD TEST: CPT

## 2017-06-21 PROCEDURE — 97116 GAIT TRAINING THERAPY: CPT

## 2017-06-21 PROCEDURE — 80048 BASIC METABOLIC PNL TOTAL CA: CPT | Performed by: PHYSICIAN ASSISTANT

## 2017-06-21 PROCEDURE — 65270000029 HC RM PRIVATE

## 2017-06-21 PROCEDURE — 74011250637 HC RX REV CODE- 250/637: Performed by: PHYSICIAN ASSISTANT

## 2017-06-21 PROCEDURE — 85018 HEMOGLOBIN: CPT | Performed by: PHYSICIAN ASSISTANT

## 2017-06-21 PROCEDURE — 36415 COLL VENOUS BLD VENIPUNCTURE: CPT | Performed by: PHYSICIAN ASSISTANT

## 2017-06-21 PROCEDURE — 97110 THERAPEUTIC EXERCISES: CPT

## 2017-06-21 PROCEDURE — 74011250637 HC RX REV CODE- 250/637: Performed by: NURSE PRACTITIONER

## 2017-06-21 PROCEDURE — 74011636637 HC RX REV CODE- 636/637: Performed by: PHYSICIAN ASSISTANT

## 2017-06-21 RX ADMIN — ACETAMINOPHEN 650 MG: 325 TABLET, FILM COATED ORAL at 17:28

## 2017-06-21 RX ADMIN — Medication 10 ML: at 22:24

## 2017-06-21 RX ADMIN — FAMOTIDINE 20 MG: 20 TABLET, FILM COATED ORAL at 08:34

## 2017-06-21 RX ADMIN — POLYETHYLENE GLYCOL 3350 17 G: 17 POWDER, FOR SOLUTION ORAL at 08:34

## 2017-06-21 RX ADMIN — PRAVASTATIN SODIUM 80 MG: 40 TABLET ORAL at 17:28

## 2017-06-21 RX ADMIN — ACETAMINOPHEN 650 MG: 325 TABLET, FILM COATED ORAL at 12:03

## 2017-06-21 RX ADMIN — REGULAR STRENGTH 325 MG: 325 TABLET ORAL at 08:34

## 2017-06-21 RX ADMIN — HYDROMORPHONE HYDROCHLORIDE 4 MG: 2 TABLET ORAL at 12:03

## 2017-06-21 RX ADMIN — VALSARTAN: 80 TABLET ORAL at 09:00

## 2017-06-21 RX ADMIN — HYDROMORPHONE HYDROCHLORIDE 4 MG: 2 TABLET ORAL at 22:21

## 2017-06-21 RX ADMIN — Medication 10 ML: at 05:02

## 2017-06-21 RX ADMIN — HYDROMORPHONE HYDROCHLORIDE 4 MG: 2 TABLET ORAL at 16:45

## 2017-06-21 RX ADMIN — INSULIN LISPRO 5 UNITS: 100 INJECTION, SOLUTION INTRAVENOUS; SUBCUTANEOUS at 16:45

## 2017-06-21 RX ADMIN — DIPHENHYDRAMINE HYDROCHLORIDE 12.5 MG: 12.5 SOLUTION ORAL at 08:34

## 2017-06-21 RX ADMIN — FAMOTIDINE 20 MG: 20 TABLET, FILM COATED ORAL at 22:21

## 2017-06-21 RX ADMIN — INSULIN LISPRO 2 UNITS: 100 INJECTION, SOLUTION INTRAVENOUS; SUBCUTANEOUS at 12:03

## 2017-06-21 RX ADMIN — REGULAR STRENGTH 325 MG: 325 TABLET ORAL at 17:28

## 2017-06-21 RX ADMIN — DOCUSATE SODIUM AND SENNOSIDES 1 TABLET: 8.6; 5 TABLET, FILM COATED ORAL at 08:34

## 2017-06-21 RX ADMIN — CEFAZOLIN 2 G: 10 INJECTION, POWDER, FOR SOLUTION INTRAVENOUS; PARENTERAL at 05:00

## 2017-06-21 RX ADMIN — LEVOTHYROXINE SODIUM 100 MCG: 100 TABLET ORAL at 06:54

## 2017-06-21 RX ADMIN — DOCUSATE SODIUM AND SENNOSIDES 1 TABLET: 8.6; 5 TABLET, FILM COATED ORAL at 17:28

## 2017-06-21 RX ADMIN — HYDROMORPHONE HYDROCHLORIDE 4 MG: 2 TABLET ORAL at 05:02

## 2017-06-21 RX ADMIN — Medication 10 ML: at 12:03

## 2017-06-21 RX ADMIN — HYDROMORPHONE HYDROCHLORIDE 4 MG: 2 TABLET ORAL at 08:34

## 2017-06-21 RX ADMIN — DEXAMETHASONE SODIUM PHOSPHATE 10 MG: 4 INJECTION, SOLUTION INTRAMUSCULAR; INTRAVENOUS at 12:02

## 2017-06-21 RX ADMIN — ACETAMINOPHEN 650 MG: 325 TABLET, FILM COATED ORAL at 05:01

## 2017-06-21 NOTE — PROGRESS NOTES
Problem: Mobility Impaired (Adult and Pediatric)  Goal: *Acute Goals and Plan of Care (Insert Text)  Physical Therapy Goals  Initiated 6/20/2017    1. Patient will move from supine to sit and sit to supine , scoot up and down and roll side to side in bed with independence within 4 days. 2. Patient will perform sit to stand with independence within 4 days. 3. Patient will ambulate with independence for 500 feet with the least restrictive device within 4 days. 4. Patient will ascend/descend 12 stairs with single handrail(s) with independence within 4 days. 5. Patient will perform home exercise program per protocol with independence within 4 days. 6. Patient will demonstrate AROM 0-90 degrees in operative joint within 4 days. PHYSICAL THERAPY TREATMENT  Patient: Arash Cannon (45 y.o. female)  Date: 6/21/2017  Diagnosis: RIGHT KNEE OA  Osteoarthritis of right knee Osteoarthritis of right knee  Procedure(s) (LRB):  RIGHT TOTAL KNEE ARTHROPLASTY WITH NAVIGATION (Right) 1 Day Post-Op  Precautions: WBAT      ASSESSMENT:  Pt continues to make excellent gains towards therapy goals and overall functional mobility, nearing baseline modified independent level with all transfers, ambulation, and stair climbing activities. Pt received supine in bed c/o increased \"stiffness\" however agreeable to participation with therapy. Pt independently assumed seated position EOB. Remaining functional mobility occurred at mod I level including sit<>stand transfer, ambulation of 300ft w/ RW, and stair climbing. Pt with steady, stable gait overall with no balance deficits noted. Consistent use of reciprocal, step through gait pattern throughout. Intact gait stability remained as pt ascended/descended 8 steps w/ bilateral handrail use as well as during car transfer. Will plan to continue progressing mobility as well as measure A/PROM of knee during next therapy session.  [facility]     Progression toward goals:  [X]      Improving appropriately and progressing toward goals  [ ]      Improving slowly and progressing toward goals  [ ]      Not making progress toward goals and plan of care will be adjusted       PLAN:  Patient continues to benefit from skilled intervention to address the above impairments. Continue treatment per established plan of care. Discharge Recommendations:  Home Health  Further Equipment Recommendations for Discharge: Owns RW       SUBJECTIVE:   I'm a little stiff after this morning but I've been in bed      OBJECTIVE DATA SUMMARY:   Range of Motion:                          Functional Mobility Training:  Bed Mobility:  Rolling: Independent  Supine to Sit: Independent     Scooting: Independent        Transfers:  Sit to Stand: Modified independent  Stand to Sit: Modified independent        Bed to Chair: Modified independent                    Ambulation/Gait Training:  Distance (ft): 300 Feet (ft)  Assistive Device: Walker, rolling;Gait belt  Ambulation - Level of Assistance: Modified independent        Gait Abnormalities: Decreased step clearance        Base of Support: Narrowed     Speed/Luisa: Pace decreased (<100 feet/min)  Step Length: Left shortened;Right shortened                               Stairs:  Number of Stairs Trained: 8  Stairs - Level of Assistance: Supervision  Rail Use: Both  Therapeutic Exercises:   Exercises performed per protocol. See morning treatment note for description. Pain:  Pain Scale 1: Numeric (0 - 10)  Pain Intensity 1: 5  Pain Location 1: Knee  Pain Orientation 1: Right  Pain Description 1: Aching  Pain Intervention(s) 1: Medication (see MAR)  Activity Tolerance:   VSS throughout on RA  Please refer to the flowsheet for vital signs taken during this treatment.   After treatment:   [X] Patient left in no apparent distress sitting up in chair  [ ] Patient left in no apparent distress in bed  [X] Call bell left within reach  [X] Nursing notified  [X] Caregiver present  [ ] Bed alarm activated      COMMUNICATION/COLLABORATION:   The patients plan of care was discussed with: Registered Nurse and NP     Oliverio Mireles, PT, DPT   Time Calculation: 16 mins

## 2017-06-21 NOTE — PROGRESS NOTES
Ortho/ NeuroSurgery NP Note    POD# 1  s/p RIGHT TOTAL KNEE ARTHROPLASTY WITH NAVIGATION     Pt OOB to the chair. No complaints. Minimal pain. VSS Afebrile. Some HTN. Huang removed. Patient is voiding in adequate amounts. Labs  Lab Results   Component Value Date/Time    HGB 9.8 06/21/2017 05:10 AM        Body mass index is 23.99 kg/(m^2). BMI greater than 30 is classified as obesity and greater than 40 is classified as morbid obesity. Dressing c.d.i, cryotherapy  Calves soft and supple; No pain with passive stretch  Sensation and motor intact  SCDs for mechanical DVT proph while in bed     PLAN:  1) PT BID Patient doing very well. 2) Aspirin 325 mg PO BID for DVT Prophylaxis   3) Plan d/c to home vs snf. Patient spoke with the [employer] and her insurance about rehab.     Suyapa Ornelas NP

## 2017-06-21 NOTE — PROGRESS NOTES
Bedside and Verbal shift change report given to [address] (oncoming nurse) by Ryan Bundy RN (offgoing nurse). Report included the following information SBAR, Kardex, OR Summary, Procedure Summary, Intake/Output and MAR.

## 2017-06-21 NOTE — PROGRESS NOTES
Problem: Mobility Impaired (Adult and Pediatric)  Goal: *Acute Goals and Plan of Care (Insert Text)  Physical Therapy Goals  Initiated 6/20/2017    1. Patient will move from supine to sit and sit to supine , scoot up and down and roll side to side in bed with independence within 4 days. 2. Patient will perform sit to stand with independence within 4 days. 3. Patient will ambulate with independence for 500 feet with the least restrictive device within 4 days. 4. Patient will ascend/descend 12 stairs with single handrail(s) with independence within 4 days. 5. Patient will perform home exercise program per protocol with independence within 4 days. 6. Patient will demonstrate AROM 0-90 degrees in operative joint within 4 days. PHYSICAL THERAPY TREATMENT  Patient: Enio Shelton (88 y.o. female)  Date: 6/21/2017  Diagnosis: RIGHT KNEE OA  Osteoarthritis of right knee Osteoarthritis of right knee  Procedure(s) (LRB):  RIGHT TOTAL KNEE ARTHROPLASTY WITH NAVIGATION (Right) 1 Day Post-Op  Precautions: WBAT      ASSESSMENT:  Pt received supine in bed, agreeable to participation with therapy. Pt assumed seated position EOB at independent level w/ intact sitting balance. Remaining functional mobility occurred with stand-by assist including sit<>stand transfer and ambulation of 180ft w/ RW support. Pt with steady, stable gait overall with no LOB or difficulty noted. Pt with consistent use of reciprocal, step-through gait pattern however limited AROM of R knee noted during ambulation. Pt performed LE strengthening/ROM as documented below and tolerated well. Will plan to assess safety during stair climbing during PM therapy session as pt has 4 steps to enter home. Upon discharge, recommend  PT. Pt owns RW therefore no equipment needed.      Progression toward goals:  [X]      Improving appropriately and progressing toward goals  [ ]      Improving slowly and progressing toward goals  [ ]      Not making progress toward goals and plan of care will be adjusted       PLAN:  Patient continues to benefit from skilled intervention to address the above impairments. Continue treatment per established plan of care. Discharge Recommendations:  Home Health  Further Equipment Recommendations for Discharge: Owns necessary equipment       SUBJECTIVE:   Patient stated I was feeling nauseous yesterday from all of the medications and anesthesia. Rell Monteiro      OBJECTIVE DATA SUMMARY:   Critical Behavior:  Neurologic State: Alert, Eyes open spontaneously  Orientation Level: Oriented X4  Cognition: Follows commands  Safety/Judgement: Awareness of environment  Range of Motion:                          Functional Mobility Training:  Bed Mobility:  Rolling: Independent  Supine to Sit: Independent     Scooting: Independent        Transfers:  Sit to Stand: Stand-by asssistance  Stand to Sit: Stand-by asssistance        Bed to Chair: Stand-by asssistance                    Balance:  Sitting: Intact  Standing: Intact; With support  Standing - Static: Good;Constant support  Standing - Dynamic : Good  Ambulation/Gait Training:  Distance (ft): 180 Feet (ft)  Assistive Device: Walker, rolling;Gait belt  Ambulation - Level of Assistance: Stand-by asssistance        Gait Abnormalities: Decreased step clearance        Base of Support: Narrowed     Speed/Luisa: Pace decreased (<100 feet/min)                                      Therapeutic Exercises:     EXERCISE   Sets   Reps   Active Active Assist   Passive Self ROM   Comments   Ankle Pumps 1 10 [X]                                        [ ]                                        [ ]                                        [ ]                                            Quad Sets 1 10 [X]                                        [ ]                                        [ ]                                        [ ]                                            Heel Slides 1 10 [X] [ ]                                        [ ]                                        [ ]                                            Debbie Furrow 1 8 [X]                                        [ ]                                        [ ]                                        [ ]                                            Knee Flexion Stretch 1 10 [X]                                        [ ]                                        [ ]                                        [ ]                                                  [ ]                                        [ ]                                        [ ]                                        [ ]                                               Pain:  Pain Scale 1: Numeric (0 - 10)  Pain Intensity 1: 1  Pain Location 1: Knee  Pain Orientation 1: Right  Pain Description 1: Aching  Pain Intervention(s) 1: Rest;Repositioned  Activity Tolerance:   VSS throughout on RA  Please refer to the flowsheet for vital signs taken during this treatment.   After treatment:   [X] Patient left in no apparent distress sitting up in chair  [ ] Patient left in no apparent distress in bed  [X] Call bell left within reach  [X] Nursing notified  [X] Caregiver present  [ ] Bed alarm activated      COMMUNICATION/COLLABORATION:   The patients plan of care was discussed with: Registered Nurse,  and NP     Yoselin Minor, PT, DPT   Time Calculation: 26 mins

## 2017-06-21 NOTE — INTERDISCIPLINARY ROUNDS
Bedside interdisciplinary rounds were held today to discuss patient plan of care and outcomes. The following members were present: Nurse Practitioner, Nurse, Clinical Care Leader, Pharmacy, Physical Therapy, and Case Management. Plan:  ASA for DVT prevention. Plan for discharge tomorrow for home vs. [facility].

## 2017-06-21 NOTE — ROUTINE PROCESS
Bedside and Verbal shift change report given to [facility] (oncoming nurse) by Gabriel Best RN (offgoing nurse). Report included the following information SBAR, Kardex, Intake/Output, MAR and Recent Results.

## 2017-06-22 VITALS
HEIGHT: 64 IN | HEART RATE: 73 BPM | DIASTOLIC BLOOD PRESSURE: 55 MMHG | RESPIRATION RATE: 18 BRPM | WEIGHT: 152.2 LBS | OXYGEN SATURATION: 99 % | BODY MASS INDEX: 25.99 KG/M2 | SYSTOLIC BLOOD PRESSURE: 128 MMHG | TEMPERATURE: 99 F

## 2017-06-22 LAB
GLUCOSE BLD STRIP.AUTO-MCNC: 111 MG/DL (ref 65–100)
GLUCOSE BLD STRIP.AUTO-MCNC: 117 MG/DL (ref 65–100)
HGB BLD-MCNC: 9 G/DL (ref 11.5–16)
SERVICE CMNT-IMP: ABNORMAL
SERVICE CMNT-IMP: ABNORMAL

## 2017-06-22 PROCEDURE — 85018 HEMOGLOBIN: CPT | Performed by: PHYSICIAN ASSISTANT

## 2017-06-22 PROCEDURE — 74011250637 HC RX REV CODE- 250/637: Performed by: PHYSICIAN ASSISTANT

## 2017-06-22 PROCEDURE — 97116 GAIT TRAINING THERAPY: CPT

## 2017-06-22 PROCEDURE — 36415 COLL VENOUS BLD VENIPUNCTURE: CPT | Performed by: PHYSICIAN ASSISTANT

## 2017-06-22 PROCEDURE — 82962 GLUCOSE BLOOD TEST: CPT

## 2017-06-22 PROCEDURE — 74011250637 HC RX REV CODE- 250/637: Performed by: NURSE PRACTITIONER

## 2017-06-22 RX ORDER — HYDROMORPHONE HYDROCHLORIDE 2 MG/1
2-4 TABLET ORAL
Qty: 80 TAB | Refills: 0 | Status: SHIPPED | OUTPATIENT
Start: 2017-06-22 | End: 2018-03-31

## 2017-06-22 RX ORDER — ASPIRIN 325 MG
325 TABLET, DELAYED RELEASE (ENTERIC COATED) ORAL 2 TIMES DAILY
Qty: 60 TAB | Refills: 0 | Status: SHIPPED | OUTPATIENT
Start: 2017-06-22 | End: 2017-07-22

## 2017-06-22 RX ORDER — POLYETHYLENE GLYCOL 3350 17 G/17G
17 POWDER, FOR SOLUTION ORAL
Qty: 15 PACKET | Refills: 0 | Status: SHIPPED | OUTPATIENT
Start: 2017-06-22 | End: 2017-07-07

## 2017-06-22 RX ORDER — ACETAMINOPHEN 325 MG/1
650 TABLET ORAL EVERY 6 HOURS
Qty: 112 TAB | Refills: 0 | Status: SHIPPED | OUTPATIENT
Start: 2017-06-22 | End: 2017-07-06

## 2017-06-22 RX ORDER — AMOXICILLIN 250 MG
1 CAPSULE ORAL DAILY
Qty: 30 TAB | Refills: 0 | Status: SHIPPED | OUTPATIENT
Start: 2017-06-22 | End: 2018-03-31

## 2017-06-22 RX ADMIN — DOCUSATE SODIUM AND SENNOSIDES 1 TABLET: 8.6; 5 TABLET, FILM COATED ORAL at 09:34

## 2017-06-22 RX ADMIN — ACETAMINOPHEN 650 MG: 325 TABLET, FILM COATED ORAL at 03:12

## 2017-06-22 RX ADMIN — FAMOTIDINE 20 MG: 20 TABLET, FILM COATED ORAL at 09:34

## 2017-06-22 RX ADMIN — HYDROMORPHONE HYDROCHLORIDE 4 MG: 2 TABLET ORAL at 09:13

## 2017-06-22 RX ADMIN — ACETAMINOPHEN 650 MG: 325 TABLET, FILM COATED ORAL at 06:42

## 2017-06-22 RX ADMIN — Medication 10 ML: at 06:43

## 2017-06-22 RX ADMIN — LEVOTHYROXINE SODIUM 100 MCG: 100 TABLET ORAL at 06:44

## 2017-06-22 RX ADMIN — ACETAMINOPHEN 650 MG: 325 TABLET, FILM COATED ORAL at 11:58

## 2017-06-22 RX ADMIN — HYDROMORPHONE HYDROCHLORIDE 4 MG: 2 TABLET ORAL at 14:01

## 2017-06-22 RX ADMIN — POLYETHYLENE GLYCOL 3350 17 G: 17 POWDER, FOR SOLUTION ORAL at 09:34

## 2017-06-22 RX ADMIN — HYDROMORPHONE HYDROCHLORIDE 4 MG: 2 TABLET ORAL at 03:12

## 2017-06-22 RX ADMIN — REGULAR STRENGTH 325 MG: 325 TABLET ORAL at 09:35

## 2017-06-22 RX ADMIN — VALSARTAN: 80 TABLET ORAL at 11:29

## 2017-06-22 NOTE — PROGRESS NOTES
Pt is a 60 y/o AAF admitted with right total knee arthroplasty with navigation. Pt is independent to ADL's and IADL's to include driving. Pt has access to a RW and will need HH at discharge. Pt was requesting to go to rehab at discharge. SW, PT, and NP, Joseph Kendrick informed pt she was too high functioning for rehab. SW informed pt her insurance would require authorization and with PT not recommending rehab she most likely would not be approved. Pt finally agreed to [facility] and chose [facility]. Referral sent to [facility] for their review and they have accepted. Pt family to transport at discharge today. Care Management Interventions  PCP Verified by CM: Yes  Mode of Transport at Discharge:  Other (see comment) (Pt family to transport at discharge)  Transition of Care Consult (CM Consult): Home Health ([facility])  [address]: No  Reason Outside [place]: Physician referred to specific agency ([facility])  Discharge Durable Medical Equipment: Yes  Physical Therapy Consult: Yes  Current Support Network: Own Home (Pt resides in a one story home with 4 steps to enter)  Confirm Follow Up Transport: Self (Pt drives but has supportive family to assist if needed)  Plan discussed with Pt/Family/Caregiver: Yes  Discharge Location  Discharge Placement: Home with home health ([facility])    ELIN Gan  Ext [phone]

## 2017-06-22 NOTE — PROGRESS NOTES
Visited by Minda Fang Partner 6/21/17.     Shahida Montoya, Lead [facility] Paging Service  [phone])

## 2017-06-22 NOTE — DISCHARGE SUMMARY
Ortho Discharge Summary    Patient ID:  Felicia Dan  [id number]  female  61 y.o.  1950    Admit date: 6/20/2017    Discharge date: 6/22/2017    Admitting Physician: Nora Kern MD     Consulting Physician(s):   Treatment Team: Attending Provider: Nora Kern MD; Utilization Review: Sinai Frazier; Care Manager: ELIN Ma    Date of Surgery:   6/20/2017     Preoperative Diagnosis:  RIGHT KNEE OA    Postoperative Diagnosis:   RIGHT KNEE OA    Procedure(s):     RIGHT TOTAL KNEE ARTHROPLASTY WITH NAVIGATION     Anesthesia Type:   General     Surgeon: Nora Kern MD                            HPI:  Pt is a 61 y.o. female who has a history of RIGHT KNEE OA  with pain and limitations of activities of daily living who presents at this time for a right TKA following the failure of conservative management. PMH:   Past Medical History:   Diagnosis Date    Arthritis     Diabetes ([facility] [address]     NIDDM    GERD (gastroesophageal reflux disease)     Hyperlipemia     Hypertension     Kidney stones     Other ill-defined conditions     colon polyps    Other ill-defined conditions     high cholesterol    Thyroid disease     HYPERTHYROID       Body mass index is 26.13 kg/(m^2). : A BMI >30 is classified as Obesity. Medications upon admission :   Prior to Admission Medications   Prescriptions Last Dose Informant Patient Reported? Taking? B.infantis-B.ani-B.long-B.bifi (PROBIOTIC 4X) 10-15 mg TbEC 6/13/2017 at Unknown time  Yes Yes   Sig: Take 1 capsule by mouth daily. amlodipine-valsartan (EXFORGE)  mg per tablet 6/19/2017 at Unknown time  Yes Yes   Sig: Take 1 Tab by mouth daily. calcium-cholecalciferol, d3, (CALCIUM 600 + D) 600-125 mg-unit tab 6/13/2017 at Unknown time  Yes Yes   Sig: Take 2 tablets by mouth daily. levothyroxine (SYNTHROID) 100 mcg tablet 6/20/2017 at 0600  Yes Yes   Sig: Take  by mouth Daily (before breakfast).    metformin (GLUCOPHAGE) 500 mg tablet 6/19/2017 at Unknown time  Yes Yes   Sig: Take 500 mg by mouth daily (with breakfast). raNITIdine (ZANTAC) 150 mg tablet 6/20/2017 at 0951  Yes Yes   Sig: Take 150 mg by mouth two (2) times a day. ranitidine hcl 150 mg capsule 6/18/2017  Yes No   Sig: Take 150 mg by mouth daily. simvastatin (ZOCOR) 40 mg tablet 6/19/2017 at Unknown time  Yes Yes   Sig: Take 40 mg by mouth nightly. triamterene-hydrochlorothiazide (DYAZIDE) 37.5-25 mg per capsule 6/19/2017 at Unknown time  Yes Yes   Sig: Take 0.5 capsules by mouth daily. Facility-Administered Medications: None        Allergies:  No Known Allergies     Hospital Course: The patient underwent surgery. Complications:  None; patient tolerated the procedure well. Was taken to the PACU in stable condition and then transferred to the ortho floor. Perioperative Antibiotics:  Ancef     Postoperative Pain Management:  Oxycodone      DVT Prophylaxis: Aspirin      Postoperative transfusions:    Number of units banked PRBCs =   none     Post Op complications: none    Hemoglobin at discharge:    Lab Results   Component Value Date/Time    HGB 9.0 (L) 06/22/2017 03:14 AM    INR 1.0 06/05/2017 01:13 PM       Dressing was changed on POD # 1. Incision - clean, dry and intact. No significant erythema or swelling. Neurovascular exam found to be within normal limits. Wound appears to be healing without any evidence of infection. Physical Therapy started on the day following surgery and participated in bed mobility, transfers and ambulation.         Gait:  Gait  Base of Support: Narrowed  Speed/Luisa: Pace decreased (<100 feet/min)  Step Length: Left shortened, Right shortened  Gait Abnormalities: Decreased step clearance  Ambulation - Level of Assistance: Modified independent  Distance (ft): 300 Feet (ft)  Assistive Device: Walker, rolling, Gait belt  Rail Use: Both  Stairs - Level of Assistance: Supervision  Number of Stairs Trained: 8                   Discharged to: Home.    Condition on Discharge:   stable    Discharge instructions:  - Anticoagulate with Aspirin   - Take pain medications as prescribed  - Resume pre hospital diet      - Discharge activity: activity as tolerated  - Ambulate with Walker;    - Weight bearing status WBAT  - Wound Care Keep wound clean and dry. See discharge instruction sheet.  - Staples to be removed 10 days after surgery            -DISCHARGE MEDICATION LIST     Current Discharge Medication List      START taking these medications    Details   acetaminophen (TYLENOL) 325 mg tablet Take 2 Tabs by mouth every six (6) hours for 14 days. Qty: 112 Tab, Refills: 0      aspirin delayed-release 325 mg tablet Take 1 Tab by mouth two (2) times a day for 30 days. Qty: 60 Tab, Refills: 0      HYDROmorphone (DILAUDID) 2 mg tablet Take 1-2 Tabs by mouth every four (4) hours as needed. Max Daily Amount: 24 mg. Qty: 80 Tab, Refills: 0      polyethylene glycol (MIRALAX) 17 gram packet Take 1 Packet by mouth daily as needed (constipation) for up to 15 days. Qty: 15 Packet, Refills: 0      senna-docusate (PERICOLACE) 8.6-50 mg per tablet Take 1 Tab by mouth daily. Qty: 30 Tab, Refills: 0         CONTINUE these medications which have NOT CHANGED    Details   raNITIdine (ZANTAC) 150 mg tablet Take 150 mg by mouth two (2) times a day. calcium-cholecalciferol, d3, (CALCIUM 600 + D) 600-125 mg-unit tab Take 2 tablets by mouth daily. B.infantis-B.ani-B.long-B.bifi (PROBIOTIC 4X) 10-15 mg TbEC Take 1 capsule by mouth daily. amlodipine-valsartan (EXFORGE)  mg per tablet Take 1 Tab by mouth daily. metformin (GLUCOPHAGE) 500 mg tablet Take 500 mg by mouth daily (with breakfast). triamterene-hydrochlorothiazide (DYAZIDE) 37.5-25 mg per capsule Take 0.5 capsules by mouth daily. levothyroxine (SYNTHROID) 100 mcg tablet Take  by mouth Daily (before breakfast). simvastatin (ZOCOR) 40 mg tablet Take 40 mg by mouth nightly. ranitidine hcl 150 mg capsule Take 150 mg by mouth daily. per medical continuation form      -Follow up in office in 2 weeks      Signed:  Cheng Rosas.  Albina Chu, MSN, ACNP, ONP-C  Orthopaedic Nurse Practitioner    6/22/2017  9:13 AM

## 2017-06-22 NOTE — PROGRESS NOTES
Ortho / Neurosurgery NP Note    POD# 1  s/p RIGHT TOTAL KNEE ARTHROPLASTY WITH NAVIGATION   Pt seen after AM PT session    Pt resting in chair  No complaints. Pain controlled and aware to ask for PRN medications    VSS Afebrile. Voiding status: + void  + BM    Labs  Lab Results   Component Value Date/Time    HGB 9.0 06/22/2017 03:14 AM      Lab Results   Component Value Date/Time    INR 1.0 06/05/2017 01:13 PM        Body mass index is 26.13 kg/(m^2). : A BMI >30 is classified as Obesity. Dressing c.d.i  Cryotherapy in place over incision  Calves soft and supple; No pain with passive stretch  Sensation and motor intact  SCDs for mechanical DVT proph while in bed     PLAN:  1) PT BID  2) Aspirin 325 mg PO BID for DVT Prophylaxis    3) Plan d/c home today.     Consuelo Ferreira NP

## 2017-06-22 NOTE — PROGRESS NOTES
Problem: Mobility Impaired (Adult and Pediatric)  Goal: *Acute Goals and Plan of Care (Insert Text)  Physical Therapy Goals  Initiated 6/20/2017    1. Patient will move from supine to sit and sit to supine , scoot up and down and roll side to side in bed with independence within 4 days. 2. Patient will perform sit to stand with independence within 4 days. 3. Patient will ambulate with independence for 500 feet with the least restrictive device within 4 days. 4. Patient will ascend/descend 12 stairs with single handrail(s) with independence within 4 days. 5. Patient will perform home exercise program per protocol with independence within 4 days. 6. Patient will demonstrate AROM 0-90 degrees in operative joint within 4 days. PHYSICAL THERAPY TREATMENT  Patient: Hugh Perez (89 y.o. female)  Date: 6/22/2017  Diagnosis: RIGHT KNEE OA  Osteoarthritis of right knee Osteoarthritis of right knee  Procedure(s) (LRB):  RIGHT TOTAL KNEE ARTHROPLASTY WITH NAVIGATION (Right) 2 Days Post-Op  Precautions: WBAT      ASSESSMENT:  Pt has continued to make excellent gains towards therapy goals and overall mobility and is now functioning at a modified independent level for all aspects of mobility. Pt received supine in bed c/o increased stiffness however agreeable to participation with therapy. Pt performed all bed mobility, sit<>stand transfers, ambulation w/ RW, stair climbing, and car transfer at modified independent level. Gait is steady and stable overall with support of RW with pt consistently utilizing reciprocal, step-through gait pattern. Intact gait stability remained as pt ascended/descended 6 steps w/ bilateral handrail use as well as while pt transferred in/out of passenger side of a car. Pt demonstrates excellent active ROM both during ambulation as well as during seated exercises, as documented below.  At this time, [facility] and does not need PM therapy session as she is functioning at modified independent level. Progression toward goals:  [X]      Improving appropriately and progressing toward goals  [ ]      Improving slowly and progressing toward goals  [ ]      Not making progress toward goals and plan of care will be adjusted       PLAN:  Patient continues to benefit from skilled intervention to address the above impairments. Continue treatment per established plan of care. Discharge Recommendations:  Home Health  Further Equipment Recommendations for Discharge:  None, owns RW       SUBJECTIVE:   Patient stated I'm just so stiff. Herman Chi      OBJECTIVE DATA SUMMARY:   Critical Behavior:  Neurologic State: Appropriate for age  Orientation Level: Oriented X4  Cognition: Follows commands  Safety/Judgement: Awareness of environment  Range of Motion:           RLE AROM  R Knee Flexion: 92  RLE PROM  R Knee Flexion: 98           Functional Mobility Training:  Bed Mobility:  Rolling: Independent  Supine to Sit: Independent     Scooting: Independent        Transfers:  Sit to Stand: Modified independent  Stand to Sit: Modified independent        Bed to Chair: Modified independent                    Balance:  Sitting: Intact  Standing: Intact; With support  Standing - Static: Good;Constant support  Standing - Dynamic : Good  Ambulation/Gait Training:  Distance (ft): 300 Feet (ft)  Assistive Device: Walker, rolling;Gait belt  Ambulation - Level of Assistance: Modified independent        Gait Abnormalities: Decreased step clearance        Base of Support: Narrowed     Speed/Luisa: Pace decreased (<100 feet/min)  Step Length: Left shortened;Right shortened                               Stairs:  Number of Stairs Trained: 8  Stairs - Level of Assistance: Modified independent  Rail Use: Both  Therapeutic Exercises:     EXERCISE   Sets   Reps   Active Active Assist   Passive Self ROM   Comments   Ankle Pumps 1 10 [X]                                        [ ]                                        [ ] [ ]                                            Quad Sets 1 10 [X]                                        [ ]                                        [ ]                                        [ ]                                            Heel Slides 1 10 [X]                                        [ ]                                        [ ]                                        [ ]                                            [address] 1 10 [X]                                        [ ]                                        [ ]                                        [ ]                                            Knee Flexion Stretch 1 10 [X]                                        [ ]                                        [ ]                                        [ ]                                                  [ ]                                        [ ]                                        [ ]                                        [ ]                                               Pain:  Pain Scale 1: Numeric (0 - 10)  Pain Intensity 1: 5  Pain Location 1: Knee  Pain Orientation 1: Right     Pain Intervention(s) 1: Medication (see MAR); Cold pack  Activity Tolerance:   VSS throughout on RA  Please refer to the flowsheet for vital signs taken during this treatment.   After treatment:   [X] Patient left in no apparent distress sitting up in chair  [ ] Patient left in no apparent distress in bed  [X] Call bell left within reach  [X] Nursing notified  [ ] Caregiver present  [ ] Bed alarm activated      COMMUNICATION/COLLABORATION:   The patients plan of care was discussed with: Registered Nurse,  and NP     Sunil Bradford PT, DPT   Time Calculation: 20 mins

## 2017-06-22 NOTE — DISCHARGE INSTRUCTIONS
[facility]  Surgery: Total Knee Replacement  Surgeon:   Kervin Bernard MD  Surgery Date:  6/20/2017     To relieve pain:   Use ice/gel packs.  Put the ice pack directly over the wound, or anywhere you are hurting or swollen.  To control pain and swelling, keep ice on regularly, especially after physical activity.  The packs should stay cold for 3-4 hours. When it is not cold anymore, rotate with the packs in the freezer.  Elevate your leg. This will also keep swelling down.  Rest for at least 20 minutes between activity or exercises.  To keep track of your pain medications, write down what you take and when you take it.  The last dose of pain medication you got in the hospital was:       Medication    Dose    Date & Time             Choose your medications based on the pain scale below:     To keep your pain under control, take Tylenol every 6 hours for 14 days - even if you feel like you dont need it.  For mild to moderate pain (1-6 on pain scale), take one pain pill every 3-4 hours as needed.  For severe pain (7-10 on pain scale), take two pain pills every 3-4 hours as needed.  To prevent nausea, take your pain medications with food. Pain Scale                   As your pain lessens:     Slowly start taking less pain medication. You may do this by waiting longer between doses or by taking smaller doses.  Stop using the pain medications as soon as you no longer need it, usually in 2-3 weeks.  Aspirin   To prevent blood clots, you will need to take Aspirin 325 mg twice a day for 30 days.  To prevent stomach upset or bleeding:   Do not take non-steroidal anti-inflammatory medications (Ibuprofen, Advil, Motrin, Naproxen, etc.)    Take Pepcid 20 mg twice a day, or a similar home medication, while you are taking a blood thinner.             OPSITE (Honeycomb dressing)     Keep your clear, waterproof dressing in place for 5-7 days after your leave the hospital.     If you are still having drainage, you will need to change your dressing in 5-7 days. You will be given one extra dressing to use at home.  If there is no more drainage from the wound, you may leave it open to the air. OPSITE DRESSING INSTRUCTIONS                           To increase and promote healing:   Stop Smoking (or at least cut back on       Smoking).  Eat a well-balanced diet (high in protein       and vitamin C).  If you have a poor appetite, drink Ensure, Glucerna, or         [place] Instant Breakfast for the next 30 days.  If you are diabetic, you should control your blood         sugars to prevent infection and help your wound         to heal.                         To prevent constipation, stay active and drink plenty of fluid.  While using pain medications, you should also take stool softeners and laxatives, such as Pericolace       and Miralax.  If you are having too many bowel       Movements, then you may need       to stop taking the laxatives.  You should have a bowel movement 3-4 days        after surgery and then at least every other day while       on pain medication.  To improve your recovery, you must be active!  Use your walker and take short walks (in your home)         about every 2 hours during the day.  Try to increase how far you walk each day.  You can put as much weight on your leg as you can tolerate while walking.  To avoid getting a stiff knee, work on getting your knee bent and straight as soon as possible.  Home health physical therapy will come to your        home a few times per week to teach you how to        get out of bed, to safely walk in your home, and        to do your exercises.  NO DRIVING until your surgeon tells you it is ok.      You can return to work when cleared by a physician.  Please call your physician immediately if you have:     Constant bleeding from your wound.  Increasing redness or swelling around your wound (Some warmth, bruising and swelling is normal).  Change in wound drainage (increase in amount, color, or bad smell).  Change in mental status (unusual behavior   Temperature over 101.5 degrees Fahrenheit      Pain or redness in the calf (back of your lower leg - see picture)     Increased swelling of the thigh, ankle, calf, or foot.  Emergency: CALL 911 if you have:     Shortness of breath     Chest pain when you cough or taking a deep breath     Please call your surgeons office at [phone]  for a follow up appointment. _   You should call as soon as you get home or the next day after discharge. Ask to make an appointment in 2 weeks.  If you have questions or concerns during normal business hours, you may reach Dr. Ramirez' Team at [phone].

## 2017-10-31 ENCOUNTER — HOSPITAL ENCOUNTER (OUTPATIENT)
Dept: ULTRASOUND IMAGING | Age: 67
Discharge: HOME | End: 2017-10-31
Attending: FAMILY MEDICINE
Payer: MEDICARE

## 2017-10-31 ENCOUNTER — HOSPITAL ENCOUNTER (OUTPATIENT)
Dept: GENERAL RADIOLOGY | Age: 67
Discharge: HOME | End: 2017-10-31
Attending: FAMILY MEDICINE
Payer: MEDICARE

## 2017-10-31 DIAGNOSIS — M54.6 PAIN IN THORACIC SPINE: ICD-10-CM

## 2017-10-31 DIAGNOSIS — R10.11 ABDOMINAL PAIN, RIGHT UPPER QUADRANT: ICD-10-CM

## 2017-10-31 PROCEDURE — 72072 X-RAY EXAM THORAC SPINE 3VWS: CPT

## 2017-10-31 PROCEDURE — 76700 US EXAM ABDOM COMPLETE: CPT

## 2018-01-31 ENCOUNTER — HOSPITAL ENCOUNTER (OUTPATIENT)
Dept: GENERAL RADIOLOGY | Age: 68
Discharge: HOME | End: 2018-01-31
Payer: MEDICARE

## 2018-01-31 DIAGNOSIS — J32.0 CHRONIC MAXILLARY SINUSITIS: ICD-10-CM

## 2018-01-31 PROCEDURE — 70220 X-RAY EXAM OF SINUSES: CPT

## 2018-03-31 ENCOUNTER — APPOINTMENT (OUTPATIENT)
Dept: GENERAL RADIOLOGY | Age: 68
End: 2018-03-31
Attending: PHYSICIAN ASSISTANT
Payer: MEDICARE

## 2018-03-31 ENCOUNTER — HOSPITAL ENCOUNTER (EMERGENCY)
Age: 68
Discharge: HOME | End: 2018-03-31
Attending: EMERGENCY MEDICINE
Payer: MEDICARE

## 2018-03-31 VITALS
BODY MASS INDEX: 25.1 KG/M2 | HEART RATE: 81 BPM | SYSTOLIC BLOOD PRESSURE: 179 MMHG | RESPIRATION RATE: 16 BRPM | HEIGHT: 64 IN | DIASTOLIC BLOOD PRESSURE: 78 MMHG | OXYGEN SATURATION: 100 % | WEIGHT: 147.05 LBS | TEMPERATURE: 98.2 F

## 2018-03-31 DIAGNOSIS — M54.6 ACUTE MIDLINE THORACIC BACK PAIN: Primary | ICD-10-CM

## 2018-03-31 PROCEDURE — 72070 X-RAY EXAM THORAC SPINE 2VWS: CPT

## 2018-03-31 PROCEDURE — 74011250637 HC RX REV CODE- 250/637: Performed by: PHYSICIAN ASSISTANT

## 2018-03-31 PROCEDURE — 99283 EMERGENCY DEPT VISIT LOW MDM: CPT

## 2018-03-31 RX ORDER — CYCLOBENZAPRINE HCL 10 MG
10 TABLET ORAL
Status: COMPLETED | OUTPATIENT
Start: 2018-03-31 | End: 2018-03-31

## 2018-03-31 RX ORDER — CYCLOBENZAPRINE HCL 10 MG
10 TABLET ORAL
Qty: 20 TAB | Refills: 0 | Status: SHIPPED | OUTPATIENT
Start: 2018-03-31 | End: 2018-08-30

## 2018-03-31 RX ADMIN — CYCLOBENZAPRINE HYDROCHLORIDE 10 MG: 10 TABLET, FILM COATED ORAL at 20:38

## 2018-04-01 NOTE — DISCHARGE INSTRUCTIONS

## 2018-04-01 NOTE — ED NOTES
All discharge paperwork reviewed with PA and patient and she denies any need for further explanation regarding these instructions.   Patient denies need for wheelchair and ambulates out of ED

## 2018-04-01 NOTE — ED PROVIDER NOTES
EMERGENCY DEPARTMENT HISTORY AND PHYSICAL EXAM      Date: 3/31/2018  Patient Name: Jordy Tse    History of Presenting Illness     Chief Complaint   Patient presents with    Back Pain     Pt ambulatory to triage with c/o upper back pain intermittently x weeks, worsening today; denies injury to back or falls       History Provided By: Patient    HPI: Jordy Tse, 74 y.o. female with PMHx significant for hypertension, arthritis, GERD, diabetes, and HLD, presents ambulatory to the ED for evaluation of upper back pain. Patient states she has had this pain intermittently for awhile, but it became worse earlier this afternoon. She denies any heavy lifting, falls, or injuries. She reports some improvement of her symptoms with Ibuprofen. She denies any associated fevers, chest pain, shortness of breath, abdominal pain, nausea, vomiting, diarrhea, numbness, or saddle paresthesias. PCP: Jocelyn Fam MD    There are no other complaints, changes, or physical findings at this time. Current Outpatient Prescriptions   Medication Sig Dispense Refill    cyclobenzaprine (FLEXERIL) 10 mg tablet Take 1 Tab by mouth three (3) times daily as needed for Muscle Spasm(s). 20 Tab 0    raNITIdine (ZANTAC) 150 mg tablet Take 150 mg by mouth two (2) times a day.  calcium-cholecalciferol, d3, (CALCIUM 600 + D) 600-125 mg-unit tab Take 2 tablets by mouth daily.  amlodipine-valsartan (EXFORGE)  mg per tablet Take 1 Tab by mouth daily.  metformin (GLUCOPHAGE) 500 mg tablet Take 500 mg by mouth daily (with breakfast).  triamterene-hydrochlorothiazide (DYAZIDE) 37.5-25 mg per capsule Take 0.5 capsules by mouth daily.  levothyroxine (SYNTHROID) 100 mcg tablet Take  by mouth Daily (before breakfast).  simvastatin (ZOCOR) 40 mg tablet Take 40 mg by mouth nightly.  B.infantis-B.ani-B.long-B.bifi (PROBIOTIC 4X) 10-15 mg TbEC Take 1 capsule by mouth daily.        Past History     Past Medical History:  Past Medical History:   Diagnosis Date    Arthritis     Diabetes ([facility] [address]     NIDDM    GERD (gastroesophageal reflux disease)     Hyperlipemia     Hypertension     Kidney stones     Other ill-defined conditions     colon polyps    Other ill-defined conditions     high cholesterol    Thyroid disease     HYPERTHYROID       Past Surgical History:  Past Surgical History:   Procedure Laterality Date    BREAST SURGERY PROCEDURE UNLISTED      right breast cyst removed    HX BREAST BIOPSY Right 10 years ago     cyst removed    HX GYN      partial hysterectomy    HX HEENT      cyst on parathyroid removed    HX ORTHOPAEDIC Bilateral     bilateral carpal tunnel release    HX ORTHOPAEDIC Left 2014    Partial Knee Replacement       Family History:  Family History   Problem Relation Age of Onset    Heart Disease Mother     Heart Disease Father      MI   [de-identified] Asthma Father     Anesth Problems Neg Hx        Social History:  Social History   Substance Use Topics    Smoking status: Former Smoker     Packs/day: 0.50     Years: 3.00     Quit date: 1/1/1991    Smokeless tobacco: Never Used    Alcohol use No       Allergies:  No Known Allergies    Review of Systems   Review of Systems   Constitutional: Negative for fever. Respiratory: Negative for shortness of breath. Cardiovascular: Negative for chest pain and palpitations. Gastrointestinal: Negative for abdominal pain, diarrhea, nausea and vomiting. Genitourinary: Negative for dysuria and frequency. Musculoskeletal: Positive for back pain. Neurological: Negative for numbness and headaches. Negative for saddle paresthesias   All other systems reviewed and are negative. Physical Exam   Physical Exam   Constitutional: She is oriented to person, place, and time. She appears well-developed and well-nourished. No distress. 73 yo -American female   HENT:   Head: Normocephalic and atraumatic.    Eyes: Conjunctivae are normal. Right eye exhibits no discharge. Left eye exhibits no discharge. Neck: Normal range of motion. Neck supple. Cardiovascular: Normal rate, regular rhythm and normal heart sounds. No murmur heard. Pulmonary/Chest: Effort normal and breath sounds normal. No respiratory distress. She has no wheezes. Musculoskeletal:   BACK: Normal spinal curvatures. No step off or deformity. NT to palpation. Negative seated SLR bilaterally. Strength of the UE and LE 5/5 and equal bilaterally. Ambulatory without difficulty. Neurological: She is alert and oriented to person, place, and time. Skin: Skin is warm and dry. She is not diaphoretic. Psychiatric: She has a normal mood and affect. Her behavior is normal.   Nursing note and vitals reviewed. Diagnostic Study Results     Radiologic Studies -   XR SPINE [facility] 2 V   Final Result          INDICATION:  Back Pain      Exam: AP, lateral and swimmers views of the thoracic spine.      FINDINGS: There is no acute fracture or subluxation. Osseous structures are  diffusely demineralized. There is mild multilevel degenerative disc disease. No  sclerotic or lytic osseous lesion is visualized     IMPRESSION  IMPRESSION: No acute fracture or subluxation. Medical Decision Making   I am the first provider for this patient. I reviewed the vital signs, available nursing notes, past medical history, past surgical history, family history and social history. Vital Signs-Reviewed the patient's vital signs. Patient Vitals for the past 12 hrs:   Temp Pulse Resp BP SpO2   03/31/18 2001 98.2 °F (36.8 °C) 81 16 179/78 100 %     Records Reviewed: Nursing Notes and Old Medical Records    Provider Notes (Medical Decision Making):   DDx: DDD, DJD, Herniated disc, Spasm, Strain    ED Course:   Initial assessment performed. The patients presenting problems have been discussed, and they are in agreement with the care plan formulated and outlined with them.   I have encouraged them to ask questions as they arise throughout their visit. Disposition:  Discharge Note:  9:03 PM  The pt is ready for discharge. The pt's signs, symptoms, diagnosis, and discharge instructions have been discussed and pt has conveyed their understanding. The pt is to follow up as recommended or return to ER should their symptoms worsen. Plan has been discussed and pt is in agreement. PLAN:  1. Discharge  Current Discharge Medication List      START taking these medications    Details   cyclobenzaprine (FLEXERIL) 10 mg tablet Take 1 Tab by mouth three (3) times daily as needed for Muscle Spasm(s). Qty: 20 Tab, Refills: 0         CONTINUE these medications which have NOT CHANGED    Details   raNITIdine (ZANTAC) 150 mg tablet Take 150 mg by mouth two (2) times a day. calcium-cholecalciferol, d3, (CALCIUM 600 + D) 600-125 mg-unit tab Take 2 tablets by mouth daily. amlodipine-valsartan (EXFORGE)  mg per tablet Take 1 Tab by mouth daily. metformin (GLUCOPHAGE) 500 mg tablet Take 500 mg by mouth daily (with breakfast). triamterene-hydrochlorothiazide (DYAZIDE) 37.5-25 mg per capsule Take 0.5 capsules by mouth daily. levothyroxine (SYNTHROID) 100 mcg tablet Take  by mouth Daily (before breakfast). simvastatin (ZOCOR) 40 mg tablet Take 40 mg by mouth nightly. B.infantis-B.ani-B.long-B.bifi (PROBIOTIC 4X) 10-15 mg TbEC Take 1 capsule by mouth daily. 2.   Follow-up Information     Follow up With Details Comments Contact Info    Floridalma Iyer MD In 1 week  [address] [place] [address] Dr Edwar [address]  [phone]        3. Rest, heat, massage and gentle stretches  4. Continue taking over the counter anti-inflammatories  Return to ED if worse     Diagnosis     Clinical Impression:   1. Acute midline thoracic back pain        Attestations:   This note is prepared by Vin España, acting as Scribe for Cha Boudreaux: The scribe's documentation has been prepared under my direction and personally reviewed by me in its entirety. I confirm that the note above accurately reflects all work, treatment, procedures, and medical decision making performed by me.

## 2018-04-20 RX ORDER — ASPIRIN 81 MG/1
81 TABLET ORAL DAILY
COMMUNITY
End: 2018-04-25

## 2018-04-20 NOTE — PERIOP NOTES
[facility]  Ambulatory Surgery Unit  Pre-operative Instructions for Endo Procedures    Procedure Date  4/25            Tentative Arrival Time 0900      1. On the day of your procedure, please report to the Ambulatory Surgery Unit Registration Desk and sign in at your designated time. The Ambulatory Surgery Unit is located in [facility] on the [facility] side of the hospitals across from the [address] [place] [address]. Please have all of your health insurance cards and a photo ID. 2. You must have someone with you to drive you home, as you should not drive a car for 24 hours following anesthesia. Please make arrangements for a responsible adult friend or family member to stay with you for at least the first 24 hours after your procedure. 3. Do not have anything to eat or drink (including water, gum, mints, coffee, juice) after midnight 4/24. This may not apply to medications prescribed by your physician. (Please note below the special instructions with medications to take the morning of your procedure.)    4. If applicable, follow the clear liquid diet and bowel prep instructions provided by your physician's office. If you do not have this information, or have any questions, please contact your physician's office. 5. We recommend you do not drink any alcoholic beverages for 24 hours before and after your procedure. 6. Contact your surgeons office for instructions on the following medications: non-steroidal anti-inflammatory drugs (i.e. Advil, Aleve), vitamins, and supplements. (Some surgeons will want you to stop these medications prior to surgery and others may allow you to take them)   **If you are currently taking Plavix, Coumadin, Aspirin and/or other blood-thinning agents, contact your surgeon for instructions. ** Your surgeon will partner with the physician prescribing these medications to determine if it is safe to stop or if you need to continue taking.  Please do not stop taking these medications without instructions from your surgeon. 7. In an effort to help prevent surgical site infection, we ask that you shower with an anti-bacterial soap (i.e. Dial or Safeguard) on the morning of your procedure. Do not apply any lotions, powders, or deodorants after showering. 8. Wear comfortable clothes. Wear glasses instead of contacts. Do not bring any jewelry or money (other than copays or fees as instructed). Do not wear make-up, particularly mascara, the morning of your procedure. Wear your hair loose or down, no ponytails, buns, estelle pins or clips. All body piercings must be removed. 9. You should understand that if you do not follow these instructions your procedure may be cancelled. If your physical condition changes (i.e. fever, cold or flu) please contact your surgeon as soon as possible. 10. It is important that you be on time. If a situation occurs where you may be late, or if you have any questions or problems, please call [phone]. 11. Your procedure time may be subject to change. You will receive a phone call the day prior to confirm your arrival time. Special Instructions: Take all medications and inhalers, as prescribed, on the morning of surgery with a sip of water EXCEPT: diabetic meds      I understand a pre-operative phone call will be made to verify my procedure time. In the event that I am not available, I give permission for a message to be left on my answering service and/or with another person?       yes      Reviewed by phone with pt, verbalized understanding.   ___________________      ___________________      ___________________  (Signature of Patient)          (Witness)                   (Date and Time)

## 2018-04-20 NOTE — PERIOP NOTES
Pt reports that she has not received written info from Dr. Cj Moya office. Pt also reports that she is 10 months post knee replacement and is questioning if abx are required. Spoke with Marybel Wray, she will have written instructions at the  for pt to . Also, Dr. Giselle Trujillo does not require abx if >6 month post joint replacement. Pt would need to go by the recommendations of Ortho. Pt will call Ortho for instructions, and  papers from Dr. Cj Moya office.

## 2018-04-24 ENCOUNTER — ANESTHESIA EVENT (OUTPATIENT)
Dept: SURGERY | Age: 68
End: 2018-04-24
Payer: MEDICARE

## 2018-04-25 ENCOUNTER — ANESTHESIA (OUTPATIENT)
Dept: SURGERY | Age: 68
End: 2018-04-25
Payer: MEDICARE

## 2018-04-25 ENCOUNTER — HOSPITAL ENCOUNTER (OUTPATIENT)
Age: 68
Setting detail: OUTPATIENT SURGERY
Discharge: HOME | End: 2018-04-25
Attending: COLON & RECTAL SURGERY | Admitting: COLON & RECTAL SURGERY
Payer: MEDICARE

## 2018-04-25 VITALS
WEIGHT: 141 LBS | OXYGEN SATURATION: 98 % | SYSTOLIC BLOOD PRESSURE: 143 MMHG | RESPIRATION RATE: 16 BRPM | BODY MASS INDEX: 24.07 KG/M2 | TEMPERATURE: 98 F | HEART RATE: 63 BPM | HEIGHT: 64 IN | DIASTOLIC BLOOD PRESSURE: 71 MMHG

## 2018-04-25 PROBLEM — Z12.11 ENCOUNTER FOR COLONOSCOPY DUE TO HISTORY OF ADENOMATOUS COLONIC POLYPS: Status: ACTIVE | Noted: 2018-04-25

## 2018-04-25 PROBLEM — Z86.010 ENCOUNTER FOR COLONOSCOPY DUE TO HISTORY OF ADENOMATOUS COLONIC POLYPS: Status: ACTIVE | Noted: 2018-04-25

## 2018-04-25 LAB
GLUCOSE BLD STRIP.AUTO-MCNC: 118 MG/DL (ref 65–100)
SERVICE CMNT-IMP: ABNORMAL

## 2018-04-25 PROCEDURE — 88305 TISSUE EXAM BY PATHOLOGIST: CPT | Performed by: COLON & RECTAL SURGERY

## 2018-04-25 PROCEDURE — 77030021352 HC CBL LD SYS DISP COVD -B: Performed by: COLON & RECTAL SURGERY

## 2018-04-25 PROCEDURE — 76060000073 HC AMB SURG ANES FIRST 0.5 HR: Performed by: COLON & RECTAL SURGERY

## 2018-04-25 PROCEDURE — 77030009426 HC FCPS BIOP ENDOSC BSC -B: Performed by: COLON & RECTAL SURGERY

## 2018-04-25 PROCEDURE — 76210000046 HC AMBSU PH II REC FIRST 0.5 HR: Performed by: COLON & RECTAL SURGERY

## 2018-04-25 PROCEDURE — 74011250636 HC RX REV CODE- 250/636

## 2018-04-25 PROCEDURE — 74011000250 HC RX REV CODE- 250

## 2018-04-25 PROCEDURE — 76210000040 HC AMBSU PH I REC FIRST 0.5 HR: Performed by: COLON & RECTAL SURGERY

## 2018-04-25 PROCEDURE — 77030020255 HC SOL INJ LR 1000ML BG: Performed by: COLON & RECTAL SURGERY

## 2018-04-25 PROCEDURE — 82962 GLUCOSE BLOOD TEST: CPT

## 2018-04-25 PROCEDURE — 76030000002 HC AMB SURG OR TIME FIRST 0.: Performed by: COLON & RECTAL SURGERY

## 2018-04-25 PROCEDURE — 74011250636 HC RX REV CODE- 250/636: Performed by: ANESTHESIOLOGY

## 2018-04-25 RX ORDER — PROPOFOL 10 MG/ML
INJECTION, EMULSION INTRAVENOUS AS NEEDED
Status: DISCONTINUED | OUTPATIENT
Start: 2018-04-25 | End: 2018-04-25 | Stop reason: HOSPADM

## 2018-04-25 RX ORDER — SODIUM CHLORIDE, SODIUM LACTATE, POTASSIUM CHLORIDE, CALCIUM CHLORIDE 600; 310; 30; 20 MG/100ML; MG/100ML; MG/100ML; MG/100ML
25 INJECTION, SOLUTION INTRAVENOUS CONTINUOUS
Status: DISCONTINUED | OUTPATIENT
Start: 2018-04-25 | End: 2018-04-25 | Stop reason: HOSPADM

## 2018-04-25 RX ORDER — SODIUM CHLORIDE 0.9 % (FLUSH) 0.9 %
5-10 SYRINGE (ML) INJECTION AS NEEDED
Status: DISCONTINUED | OUTPATIENT
Start: 2018-04-25 | End: 2018-04-25 | Stop reason: HOSPADM

## 2018-04-25 RX ORDER — LIDOCAINE HYDROCHLORIDE 10 MG/ML
0.1 INJECTION, SOLUTION EPIDURAL; INFILTRATION; INTRACAUDAL; PERINEURAL AS NEEDED
Status: DISCONTINUED | OUTPATIENT
Start: 2018-04-25 | End: 2018-04-25 | Stop reason: HOSPADM

## 2018-04-25 RX ORDER — DIPHENHYDRAMINE HYDROCHLORIDE 50 MG/ML
12.5 INJECTION, SOLUTION INTRAMUSCULAR; INTRAVENOUS AS NEEDED
Status: DISCONTINUED | OUTPATIENT
Start: 2018-04-25 | End: 2018-04-25 | Stop reason: HOSPADM

## 2018-04-25 RX ORDER — SODIUM CHLORIDE 0.9 % (FLUSH) 0.9 %
5-10 SYRINGE (ML) INJECTION EVERY 8 HOURS
Status: DISCONTINUED | OUTPATIENT
Start: 2018-04-25 | End: 2018-04-25 | Stop reason: HOSPADM

## 2018-04-25 RX ORDER — ONDANSETRON 2 MG/ML
4 INJECTION INTRAMUSCULAR; INTRAVENOUS AS NEEDED
Status: DISCONTINUED | OUTPATIENT
Start: 2018-04-25 | End: 2018-04-25 | Stop reason: HOSPADM

## 2018-04-25 RX ORDER — FENTANYL CITRATE 50 UG/ML
25 INJECTION, SOLUTION INTRAMUSCULAR; INTRAVENOUS
Status: DISCONTINUED | OUTPATIENT
Start: 2018-04-25 | End: 2018-04-25 | Stop reason: HOSPADM

## 2018-04-25 RX ORDER — LIDOCAINE HYDROCHLORIDE 20 MG/ML
INJECTION, SOLUTION EPIDURAL; INFILTRATION; INTRACAUDAL; PERINEURAL AS NEEDED
Status: DISCONTINUED | OUTPATIENT
Start: 2018-04-25 | End: 2018-04-25 | Stop reason: HOSPADM

## 2018-04-25 RX ADMIN — SODIUM CHLORIDE, SODIUM LACTATE, POTASSIUM CHLORIDE, AND CALCIUM CHLORIDE 25 ML/HR: 600; 310; 30; 20 INJECTION, SOLUTION INTRAVENOUS at 09:32

## 2018-04-25 RX ADMIN — PROPOFOL 40 MG: 10 INJECTION, EMULSION INTRAVENOUS at 10:55

## 2018-04-25 RX ADMIN — PROPOFOL 20 MG: 10 INJECTION, EMULSION INTRAVENOUS at 11:08

## 2018-04-25 RX ADMIN — LIDOCAINE HYDROCHLORIDE 40 MG: 20 INJECTION, SOLUTION EPIDURAL; INFILTRATION; INTRACAUDAL; PERINEURAL at 10:54

## 2018-04-25 RX ADMIN — PROPOFOL 20 MG: 10 INJECTION, EMULSION INTRAVENOUS at 11:03

## 2018-04-25 RX ADMIN — PROPOFOL 40 MG: 10 INJECTION, EMULSION INTRAVENOUS at 10:54

## 2018-04-25 RX ADMIN — PROPOFOL 40 MG: 10 INJECTION, EMULSION INTRAVENOUS at 10:58

## 2018-04-25 NOTE — H&P
[address]  HISTORY AND PHYSICAL      Dariusz Brumfield  MR#: [id number]  : 1950  ACCOUNT #: [de-identified]   ADMIT DATE: 2018    CHIEF COMPLAINT:  Followup colonoscopy for a personal history of colonic polyps. HISTORY:  The patient is a 72-year-old woman who has had a history of colonic polyps. She has had a tubular adenoma in the past.  She is in for 5-year interval followup exam today. PAST MEDICAL HISTORY:  Significant for knee replacement. She has had history of diabetes, hyperlipidemia, hypertension, and hypothyroidism. MEDICATIONS:  Include L-thyroxine, metformin, simvastatin, triamterene, hydrochlorothiazide, Exforge, 81 mg of aspirin (held for this exam), and multivitamins. ALLERGIES:  NONE. HABITS:  She rarely drinks, does not smoke. FAMILY HISTORY:  Significant for multiple colonic polyps in previous family members, including mother, 2 sisters and a brother. Otherwise, family history is negative. REVIEW OF SYSTEMS:  Otherwise clear. PHYSICAL EXAMINATION:  Pending. ASSESSMENT:  A 72-year-old woman for a followup screening colonoscopy for personal history of tubular adenoma of the colon. She is aware of the risks of the procedure, including bleeding, perforation, and the risk of missing small polyps, and she is agreeable to proceed. PLAN:  We will move forward with colonoscopy today.       Nacho Artis MD       WRT/PN  D: 2018 22:08     T: 2018 23:18  JOB #: [id number]  CC: Aaron Sandhu MD  CC: Jed Le MD

## 2018-04-25 NOTE — PERIOP NOTES
Permission received to review discharge instructions and discuss private health information with friend Chandra Plummer

## 2018-04-25 NOTE — ANESTHESIA POSTPROCEDURE EVALUATION
Post-Anesthesia Evaluation and Assessment    Patient: Osiris Mcdaniels MRN: [id number]  SSN: [id number]    YOB: 1950  Age: 74 y.o. Sex: female       Cardiovascular Function/Vital Signs  Visit Vitals    /70    Pulse 72    Temp 36.7 °C (98 °F)    Resp 15    Ht 5' 4\" (1.626 m)    Wt 64 kg (141 lb)    SpO2 99%    Breastfeeding No    BMI 24.2 kg/m2       Patient is status post general, total IV anesthesia anesthesia for Procedure(s):  COLONOSCOPY  COLON BIOPSY. Nausea/Vomiting: None    Postoperative hydration reviewed and adequate. Pain:  Pain Scale 1: Numeric (0 - 10) (04/25/18 1124)  Pain Intensity 1: 0 (04/25/18 1124)   Managed    Neurological Status:   Neuro (WDL): Exceptions to WDL (04/25/18 1119)  Neuro  Neurologic State: Alert (04/25/18 1121)  LUE Motor Response: Purposeful (04/25/18 1119)  LLE Motor Response: Purposeful (04/25/18 1119)  RUE Motor Response: Purposeful (04/25/18 1119)  RLE Motor Response: Purposeful (04/25/18 1119)   At baseline    Mental Status and Level of Consciousness: Arousable    Pulmonary Status:   O2 Device: Room air (04/25/18 1119)   Adequate oxygenation and airway patent    Complications related to anesthesia: None    Post-anesthesia assessment completed.  No concerns    Signed By: Kirby Pineda MD     April 25, 2018

## 2018-04-25 NOTE — BRIEF OP NOTE
BRIEF OPERATIVE NOTE    Date of Procedure: 4/25/2018   Preoperative Diagnosis: HISTORY OF POLYPS  Postoperative Diagnosis: COLON POLYP    Procedure(s):  COLONOSCOPY  COLON BIOPSY  Surgeon(s) and Role:     * Berto Dickens MD - Primary    Surgical Staff:  Circ-1: Velma Kenny RN  Circ-2: Mathew Mclaughlin RN  Scrub Tech-1: Stephen Diamondiz  Event Time In   Incision Start 1056   Incision Close 1113     Anesthesia: MAC   Estimated Blood Loss: 1 cc's  Specimens:   ID Type Source Tests Collected by Time Destination   1 : ASCENDING COLON POLYP BIOPSY (OPPOSITE OF ILEOCECAL VALVE) Preservative   Berto Dickens MD 4/25/2018 1103 Pathology   2 : SIGMOID COLON POLYP BIOPSY  Preservative   Berto Dickens MD 4/25/2018 1108 Pathology      Findings: 5 mm polyp at proximal sigmoid, and 5 mm polyp opposite ileocecal valve -- both removed with bx/fulguration;   Otherwise normal exam.  Complications: none  Implants: * No implants in log *

## 2018-04-25 NOTE — INTERVAL H&P NOTE
H&P Update:  Kenji Watson was seen and examined. History and physical has been reviewed. Significant clinical changes have occurred as noted:  ENT negative;  Lungs clear;  Neck supple; Cor regular; Abdomen benign;  Rectal pending c-scope; Extremities and neuro WNL.     Signed By: Marlena Gates MD     April 25, 2018 10:42 AM

## 2018-04-25 NOTE — ANESTHESIA PREPROCEDURE EVALUATION
Anesthetic History   No history of anesthetic complications            Review of Systems / Medical History  Patient summary reviewed, nursing notes reviewed and pertinent labs reviewed    Pulmonary          Smoker      Comments: Former Smoker - 1.5 pack years   Neuro/Psych   Within defined limits           Cardiovascular    Hypertension: well controlled          Hyperlipidemia    Exercise tolerance: >4 METS     GI/Hepatic/Renal     GERD: well controlled    Renal disease: stones      Comments: Hx colon polyps Endo/Other    Diabetes: poorly controlled, type 2  Hypothyroidism: well controlled  Arthritis     Other Findings   Comments: Osteoarthritis  DJD  Hx of colon polyps         Physical Exam    Airway  Mallampati: II  TM Distance: > 6 cm  Neck ROM: normal range of motion   Mouth opening: Normal     Cardiovascular  Regular rate and rhythm,  S1 and S2 normal,  no murmur, click, rub, or gallop  Rhythm: regular  Rate: normal         Dental    Dentition: Lower dentition intact and Upper dentition intact  Comments: No loose teeth   Pulmonary  Breath sounds clear to auscultation               Abdominal  GI exam deferred       Other Findings            Anesthetic Plan    ASA: 2  Anesthesia type: general and total IV anesthesia    Monitoring Plan: BIS      Induction: Intravenous  Anesthetic plan and risks discussed with: Patient

## 2018-04-25 NOTE — OP NOTES
[address] REPORT    Marly Turner  MR#: [id number]  : 1950  ACCOUNT #: [de-identified]   DATE OF SERVICE: 2018    PREOPERATIVE DIAGNOSIS:  Previous history of colonic polyps. POSTOPERATIVE DIAGNOSIS:  Previous history of colonic polyps with a 5 mm polyp opposite ileocecal valve and another 5 mm polyp at the proximal sigmoid colon;  both polyps removed with biopsy, fulguration and retrieved;  otherwise exam negative without diverticula, other polyps, or inflammation. NAME OF PROCEDURE:  Total colonoscopy and biopsy, fulguration x2 of colonic polyps. SURGEON:  Damaris Garcia M.D. ANESTHESIA:  IV sedation with propofol per Anesthesia. SPECIMENS:  Two colonic polyps, 1 opposite ileocecal valve, 1 at proximal sigmoid colon. BLOOD LOSS:  1 mL. COMPLICATIONS:  None. INDICATIONS:  The patient is a 82-year-old black female with a previous history of colonic polyps. She is in for a 5 year interval exam.  She is aware of the risks of the procedure including bleeding, perforation and the risk of missing small polyps and she is agreeable to proceed. PROCEDURE IN DETAIL:  After uneventful induction of IV sedation, the patient was placed in left lateral position. The pediatric 180 stapling scope passed through the colon to the cecal cap without difficulty. Position was confirmed with light reflex and local anatomic landmarks. A photograph was obtained of the ileocecal valve to document location and anatomy. Prep was excellent. Scope was slowly and carefully pulled back. Exactly opposite the ileocecal valve, there was a 4-5 mm polyp, which was removed with biopsy and fulguration. The remainder of the ascending colon and cecal area were normal.  Scope was pulled back through the transverse colon, which was also normal.  Scope was retrieved back through the descending colon, which was normal without any diverticula, polyps or inflammation.   The scope was returned back to the sigmoid and as we were in the proximal sigmoid, another 5 mm polyp was seen and this was also removed with biopsy, fulguration. Scope was retrieved through the remainder of the sigmoid, which was otherwise negative. The rectum was unremarkable. The scope was retracted back down to the anal canal, which was normal.  Air was aspirated. The scope was removed and the exam terminated. She tolerated the exam well, remained stable and left with a benign abdomen. She will have to undergo followup exam in 5 years.       MD AARTI Whitehead /   D: 04/25/2018 11:30     T: 04/25/2018 15:17  JOB #: [id number]  CC: Ivanna Gibson MD  CC: Jennifer Salter MD

## 2018-04-25 NOTE — PERIOP NOTES
Jessica Stanley  1950  [id number]    Situation:  Verbal report given from: SCOTT Johnston RN and SEVEN RodriguezProcedure: Procedure(s):  COLONOSCOPY  COLON BIOPSY    Background:    Preoperative diagnosis: HISTORY OF POLYPS    Postoperative diagnosis: COLON POLYP    :  Dr. Rochelle Toure    Assistant(s): Circ-1: Dorina Goodell, RN  Circ-2: Dana Walker RN  Scrub Tech-1: Yue No. Rodriguez    Specimens:   ID Type Source Tests Collected by Time Destination   1 : ASCENDING COLON POLYP BIOPSY  Preservative   Henri Jacobs MD 4/25/2018 1103 Pathology       Assessment:  Intra-procedure medications   Propofol  160 mg      Anesthesia gave intra-procedure sedation and medications, see anesthesia flow sheet     Intravenous fluids: LR@ KVO     Vital signs stable     Abdominal assessment: round and soft       Recommendation:    Permission to share finding with friend Kelvin yes per preop note    All side rails up, bed in low position, wheels locked. Nurse at bedside.

## 2018-04-25 NOTE — IP AVS SNAPSHOT
[address] [facility] 
[phone] Patient: Jordy Tse MRN: [id number] DPW:0/4/2248 About your hospitalization You were admitted on:  April 25, 2018 You last received care in the:  [facility] PACU You were discharged on:  April 25, 2018 Why you were hospitalized Your primary diagnosis was:  Encounter For Colonoscopy Due To History Of Adenomatous Colonic Polyps Follow-up Information Follow up With Details Comments Contact Info Raquel Jerome MD   [address] 13[address] [facility] Edwar Leal  
[phone] Your Scheduled Appointments Monday April 30, 2018  8:20 AM EDT  
[facility] MAMMO SCREENING with [facility] 3 [facility]) [address] [place] [address]  
[phone] Shower or bathe using soap and water. Do not use deodorant, powder, perfumes, or lotion the day of your exam.  If your prior mammograms were not performed at [facility] 6 please bring films with you or forward prior images 2 days before your procedure. Check in at registration 15min before your appointment time unless you were instructed to do otherwise. A script is not necessary, but if you have one, please bring it on the day of the mammogram or have it faxed to the department. [facility] [phone] [facility]  [phone] [facility] 19 [facility]  [phone] [facility] [phone] [facility] 4427 [facility] [phone] Patient should report to Outpatient Registration Located at the [address] [place] [address]. Physical Address:  [address] [place] [address] Estela Mayen Discharge Orders None A check huang indicates which time of day the medication should be taken. My Medications CONTINUE taking these medications Instructions Each Dose to Equal  
 Morning Noon Evening Bedtime CALCIUM 600 + D 600-125 mg-unit Tab Generic drug:  calcium-cholecalciferol (d3) Your last dose was: Your next dose is: Take 2 tablets by mouth daily. 2 Tab  
    
   
   
   
  
 cyclobenzaprine 10 mg tablet Commonly known as:  FLEXERIL Your last dose was: Your next dose is: Take 1 Tab by mouth three (3) times daily as needed for Muscle Spasm(s). 10 mg EXFORGE  mg per tablet Generic drug:  amLODIPine-valsartan Your last dose was: Your next dose is: Take 1 Tab by mouth daily. 1 Tab GINGER EXTRACT 250 mg Cap Generic drug:  ginger (Zingiber officinalis) Your last dose was: Your next dose is: Take 1 Tab by mouth daily. 1 Tab  
    
   
   
   
  
 metFORMIN 500 mg tablet Commonly known as:  GLUCOPHAGE Your last dose was: Your next dose is: Take 500 mg by mouth nightly. 500 mg PROBIOTIC 4X 10-15 mg Tbec Generic drug:  B.infantis-B.ani-B.long-B.bifi Your last dose was: Your next dose is: Take 1 capsule by mouth daily. 1 Cap  
    
   
   
   
  
 simvastatin 40 mg tablet Commonly known as:  ZOCOR Your last dose was: Your next dose is: Take 40 mg by mouth nightly. 40 mg SYNTHROID 100 mcg tablet Generic drug:  levothyroxine Your last dose was: Your next dose is: Take 100 mcg by mouth daily. Monday-Saturday only 100 mcg  
    
   
   
   
  
 triamterene-hydroCHLOROthiazide 37.5-25 mg per capsule Commonly known as:  Kyra Radha Your last dose was: Your next dose is: Take 0.5 Caps by mouth daily. Indications: hypertension  
 0.5 Cap TURMERIC (BULK) Your last dose was: Your next dose is: Take 1 Tab by mouth daily. 1 Tab ZANTAC 150 mg tablet Generic drug:  raNITIdine Your last dose was: Your next dose is: Take 150 mg by mouth two (2) times daily as needed. 150 mg  
    
   
   
   
  
  
STOP taking these medications   
 aspirin delayed-release 81 mg tablet Discharge Instructions Pedro Wise [id number] 1950 COLON DISCHARGE INSTRUCTIONS Discomfort: 
Redness at IV site- apply warm compress to area; if redness or soreness persist- contact your physician There may be a slight amount of blood passed from the rectum Gaseous discomfort- walking, belching will help relieve any discomfort You may not operate a vehicle for 24 hours You may not engage in an occupation involving machinery or appliances for rest of today You may not drink alcoholic beverages for at least 24 hours Avoid making any critical decisions for at least 24 hour DIET: 
 Regular diet.  however -  remember your colon is empty and a heavy meal will produce gas. Avoid these foods:  vegetables, fried / greasy foods, carbonated drinks for today ACTIVITY: 
You may not  resume your normal daily activities it is recommended that you spend the remainder of the day resting -  avoid any strenuous activity. CALL M.D. ANY SIGN OF: Increasing pain, nausea, vomiting Abdominal distension (swelling) New increased bleeding (oral or rectal) Fever (chills) Pain in chest area Bloody discharge from nose or mouth Shortness of breath You may not  take any Advil, Aspirin, Ibuprofen, Motrin, Aleve, or Goodys for 10 days, ONLY  Tylenol as needed for pain. Post procedure diagnosis:  COLON POLYP Follow-up Instructions: 
 Call Dr. Norberto Sharp if any questions Telephone #  [phone] Additional instructions ;  followup c-scope in 5 years. Pedro Wise [id number] 1950 DISCHARGE SUMMARY from Nurse The following personal items collected during your admission are returned to you:  
Dental Appliance: Dental Appliances: None Vision: Visual Aid: Glasses, With patient (placed in PACU) Hearing Aid:   
Jewelry:   
Clothing:   
Other Valuables:   
Valuables sent to safe:   
 
 
 
 
DO NOT TAKE SLEEPING MEDICATIONS OR ANTIANXIETY MEDICATIONS WHILE TAKING NARCOTIC PAIN MEDICATIONS,  ESPECIALLY THE NIGHT OF ANESTHESIA. CPAP PATIENTS BE SURE TO WEAR MACHINE WHENEVER NAPPING OR SLEEPING. DISCHARGE SUMMARY from Nurse The following personal items collected during your admission are returned to you:  
Dental Appliance: Dental Appliances: None Vision: Visual Aid: Glasses, With patient (placed in PACU) Hearing Aid:   
Jewelry:   
Clothing:   
Other Valuables:   
Valuables sent to safe:   
 
 
PATIENT INSTRUCTIONS: 
 
 
B - Balance E - Eyes F-  Face looks uneven A-  Arms unable to move or move even S-  Speech slurred or non-existent T-  Time-call 911 as soon as signs and symptoms begin-DO NOT go Back to bed or wait to see if you get better-TIME IS BRAIN. If you have not received your influenza and/or pneumococcal vaccine, please follow up with your primary care physician. The discharge information has been reviewed with the patient and caregiver. The patient and caregiver verbalized understanding. Introducing [facility]! [employer] introduces Graphicly patient portal. Now you can access parts of your medical record, email your doctor's office, and request medication refills online. 1. In your internet browser, go to [email or web address] 2. Click on the First Time User? Click Here link in the Sign In box. You will see the New Member Sign Up page. 3. Enter your Graphicly Access Code exactly as it appears below. You will not need to use this code after youve completed the sign-up process. If you do not sign up before the expiration date, you must request a new code. · Graphicly Access Code: [email or web address] Expires: 5/1/2018 12:17 PM 
 
4. Enter the last four digits of your Social Security Number (xxxx) and Date of Birth (mm/dd/yyyy) as indicated and click Submit. You will be taken to the next sign-up page. 5. Create a Graphicly ID. This will be your Graphicly login ID and cannot be changed, so think of one that is secure and easy to remember. 6. Create a ViralNinjas password. You can change your password at any time. 7. Enter your Password Reset Question and Answer. This can be used at a later time if you forget your password. 8. Enter your e-mail address. You will receive e-mail notification when new information is available in [address] 9. Click Sign Up. You can now view and download portions of your medical record. 10. Click the Download Summary menu link to download a portable copy of your medical information. If you have questions, please visit the Frequently Asked Questions section of the ViralNinjas website. Remember, ViralNinjas is NOT to be used for urgent needs. For medical emergencies, dial 911. Now available from your iPhone and Android! Introducing Shaan Mendoza As a [facility] patient, I wanted to make you aware of our electronic visit tool called Shaan Mendoza. [facility] 24/7 allows you to connect within minutes with a medical provider 24 hours a day, seven days a week via a mobile device or tablet or logging into a secure website from your computer. You can access Shaan Mendoza from anywhere in the [place]. A virtual visit might be right for you when you have a simple condition and feel like you just dont want to get out of bed, or cant get away from work for an appointment, when your regular [facility] provider is not available (evenings, weekends or holidays), or when youre out of town and need minor care. Electronic visits cost only $49 and if the [facility] 24/7 provider determines a prescription is needed to treat your condition, one can be electronically transmitted to a nearby pharmacy*. Please take a moment to enroll today if you have not already done so. The enrollment process is free and takes just a few minutes. To enroll, please download the [facility] 24/7 estephania to your tablet or phone, or visit [email or web address] to enroll on your computer. And, as an [address] [place] [address] patient with a [employer] account, the results of your visits will be scanned into your electronic medical record and your primary care provider will be able to view the scanned results. We urge you to continue to see your regular [facility] provider for your ongoing medical care. And while your primary care provider may not be the one available when you seek a Shaan Mendoza virtual visit, the peace of mind you get from getting a real diagnosis real time can be priceless. For more information on Shaan Mendoza, view our Frequently Asked Questions (FAQs) at [email or web address]. Sincerely, 
 
Eren Harper MD 
Chief Medical Officer Iveth Bethea *:  certain medications cannot be prescribed via Shaan Bennetthayfin Providers Seen During Your Hospitalization Provider Specialty Primary office phone Lori Mustafa MD Colon and Rectal Surgery [phone] Your Primary Care Physician (PCP) Primary Care Physician Office Phone Office Fax [place] [phone] You are allergic to the following No active allergies Recent Documentation Height Weight Breastfeeding? BMI OB Status Smoking Status 1.626 m 64 kg No 24.2 kg/m2 Hysterectomy Former Smoker Emergency Contacts Name Discharge Info Relation Home Work Mobile Christelle Becerril DISCHARGE CAREGIVER [3] Daughter [21] [phone] Aideemaiachar Elijahapriljc DISCHARGE CAREGIVER [3] Daughter [21] [phone] Patient Belongings The following personal items are in your possession at time of discharge: 
  Dental Appliances: None  Visual Aid: Glasses, With patient (placed in PACU) Please provide this summary of care documentation to your next provider.  
  
  
 
  
Signatures-by signing, you are acknowledging that this After Visit Summary has been reviewed with you and you have received a copy. Patient Signature:  ____________________________________________________________ Date:  ____________________________________________________________  
  
Otto Cart Provider Signature:  ____________________________________________________________ Date:  ____________________________________________________________

## 2018-04-25 NOTE — DISCHARGE INSTRUCTIONS
Jose Robertoluis fernando Lowe  [id number]  1950    COLON DISCHARGE INSTRUCTIONS  Discomfort:  Redness at IV site- apply warm compress to area; if redness or soreness persist- contact your physician  There may be a slight amount of blood passed from the rectum  Gaseous discomfort- walking, belching will help relieve any discomfort  You may not operate a vehicle for 24 hours  You may not engage in an occupation involving machinery or appliances for rest of today  You may not drink alcoholic beverages for at least 24 hours  Avoid making any critical decisions for at least 24 hour  DIET:   Regular diet. - however -  remember your colon is empty and a heavy meal will produce gas. Avoid these foods:  vegetables, fried / greasy foods, carbonated drinks for today     ACTIVITY:  You may not  resume your normal daily activities it is recommended that you spend the remainder of the day resting -  avoid any strenuous activity. CALL M.D. ANY SIGN OF:   Increasing pain, nausea, vomiting  Abdominal distension (swelling)  New increased bleeding (oral or rectal)  Fever (chills)  Pain in chest area  Bloody discharge from nose or mouth  Shortness of breath    You may not  take any Advil, Aspirin, Ibuprofen, Motrin, Aleve, or Goodys for 10 days, ONLY  Tylenol as needed for pain. Post procedure diagnosis:  COLON POLYP  Follow-up Instructions:   Call Dr. Marquis Devine if any questions  Telephone #  [phone]  Additional instructions ;  followup c-scope in 5 years.                   Jose Robertoluis fernando Lowe  [id number]  1950        DISCHARGE SUMMARY from Nurse    The following personal items collected during your admission are returned to you:   Dental Appliance: Dental Appliances: None  Vision: Visual Aid: Glasses, With patient (placed in PACU)  Hearing Aid:    Jewelry:    Clothing:    Other Valuables:    Valuables sent to safe:            DO NOT TAKE SLEEPING MEDICATIONS OR ANTIANXIETY MEDICATIONS WHILE TAKING NARCOTIC PAIN MEDICATIONS,  ESPECIALLY THE NIGHT OF ANESTHESIA. CPAP PATIENTS BE SURE TO WEAR MACHINE WHENEVER NAPPING OR SLEEPING. DISCHARGE SUMMARY from Nurse    The following personal items collected during your admission are returned to you:   Dental Appliance: Dental Appliances: None  Vision: Visual Aid: Glasses, With patient (placed in PACU)  Hearing Aid:    Jewelry:    Clothing:    Other Valuables:    Valuables sent to safe:        PATIENT INSTRUCTIONS:    After General Anesthesia or Intravenous Sedation, for 24 hours or while taking prescription Narcotics:        Someone should be with you for the next 24 hours. For your own safety, a responsible adult must drive you home. · Limit your activities  · Recommended activity: Rest today, up with assistance today. Do not climb stairs or shower unattended for the next 24 hours. · Please start with a soft bland diet and advance as tolerated (no nausea) to regular diet. · If you have a sore throat you should try the following: fluids, warm salt water gargles, or throat lozenges. If it does not improve after several days please follow up with your primary physician. · Do not drive and operate hazardous machinery  · Do not make important personal or business decisions  · Do  not drink alcoholic beverages  · If you have not urinated within 8 hours after discharge, please contact your surgeon on call. Report the following to your surgeon:  · Excessive pain, swelling, redness or odor of or around the surgical area  · Temperature over 100.5  · Nausea and vomiting lasting longer than 4 hours or if unable to take medications  · Any signs of decreased circulation or nerve impairment to extremity: change in color, persistent  numbness, tingling, coldness or increase pain      · You will receive a Post Operative Call from one of the Recovery Room Nurses on the day after your surgery to check on you. It is very important for us to know how you are recovering after your surgery.  If you have an issue or need to speak with someone, please call your surgeon, do not wait for the post operative call. · You may receive an e-mail or letter in the mail from [employer] regarding your experience with us in the Ambulatory Surgery Unit. Your feedback is valuable to us and we appreciate your participation in the survey. · If the above instructions are not adequate, please contact Rima Apodaca RN, Lizbeth anesthesia Nurse Manager or our Anesthesiologist, at [phone]. If you are having problems after your surgery, call the physician at his office number. · We wish you a speedy recovery ? What to do at Home:      *  Please give a list of your current medications to your Primary Care Provider. *  Please update this list whenever your medications are discontinued, doses are      changed, or new medications (including over-the-counter products) are added. *  Please carry medication information at all times in case of emergency situations. These are general instructions for a healthy lifestyle:    No smoking/ No tobacco products/ Avoid exposure to second hand smoke    Surgeon General's Warning:  Quitting smoking now greatly reduces serious risk to your health. Obesity, smoking, and sedentary lifestyle greatly increases your risk for illness    A healthy diet, regular physical exercise & weight monitoring are important for maintaining a healthy lifestyle    You may be retaining fluid if you have a history of heart failure or if you experience any of the following symptoms:  Weight gain of 3 pounds or more overnight or 5 pounds in a week, increased swelling in our hands or feet or shortness of breath while lying flat in bed. Please call your doctor as soon as you notice any of these symptoms; do not wait until your next office visit.     Recognize signs and symptoms of STROKE:    B - Balance  E - Eyes    F-  Face looks uneven    A-  Arms unable to move or move even    S-  Speech slurred or non-existent    T-  Time-call 911 as soon as signs and symptoms begin-DO NOT go       Back to bed or wait to see if you get better-TIME IS BRAIN. If you have not received your influenza and/or pneumococcal vaccine, please follow up with your primary care physician. The discharge information has been reviewed with the patient and caregiver. The patient and caregiver verbalized understanding.

## 2018-04-25 NOTE — PERIOP NOTES
to bedside to talk with pt/friend. Pt drank apple juice without difficulty. 1148 Discharged to home via/wc,accompanied to car per RN. Skin warm and dry, awake and alert. Respirations even, unlabored. Pt and family members questions and concerns addressed prior to discharge. All belongings (clothing, glasses) with pt.

## 2018-04-30 ENCOUNTER — HOSPITAL ENCOUNTER (OUTPATIENT)
Dept: MAMMOGRAPHY | Age: 68
Discharge: HOME | End: 2018-04-30
Attending: FAMILY MEDICINE
Payer: MEDICARE

## 2018-04-30 DIAGNOSIS — Z12.39 BREAST SCREENING: ICD-10-CM

## 2018-04-30 PROCEDURE — 77067 SCR MAMMO BI INCL CAD: CPT

## 2018-08-31 ENCOUNTER — HOSPITAL ENCOUNTER (OUTPATIENT)
Age: 68
Setting detail: OUTPATIENT SURGERY
Discharge: HOME | End: 2018-08-31
Attending: INTERNAL MEDICINE | Admitting: INTERNAL MEDICINE
Payer: MEDICARE

## 2018-08-31 ENCOUNTER — ANESTHESIA EVENT (OUTPATIENT)
Dept: ENDOSCOPY | Age: 68
End: 2018-08-31
Payer: MEDICARE

## 2018-08-31 ENCOUNTER — ANESTHESIA (OUTPATIENT)
Dept: ENDOSCOPY | Age: 68
End: 2018-08-31
Payer: MEDICARE

## 2018-08-31 VITALS
DIASTOLIC BLOOD PRESSURE: 80 MMHG | WEIGHT: 141.9 LBS | HEART RATE: 73 BPM | RESPIRATION RATE: 16 BRPM | BODY MASS INDEX: 24.22 KG/M2 | HEIGHT: 64 IN | OXYGEN SATURATION: 100 % | SYSTOLIC BLOOD PRESSURE: 144 MMHG | TEMPERATURE: 97.6 F

## 2018-08-31 LAB
GLUCOSE BLD STRIP.AUTO-MCNC: 113 MG/DL (ref 65–100)
H PYLORI FROM TISSUE: NEGATIVE
KIT LOT NO., HCLOLOT: NORMAL
NEGATIVE CONTROL: NEGATIVE
POSITIVE CONTROL: POSITIVE
SERVICE CMNT-IMP: ABNORMAL

## 2018-08-31 PROCEDURE — 74011250636 HC RX REV CODE- 250/636

## 2018-08-31 PROCEDURE — 74011000250 HC RX REV CODE- 250

## 2018-08-31 PROCEDURE — 76040000019: Performed by: INTERNAL MEDICINE

## 2018-08-31 PROCEDURE — 88305 TISSUE EXAM BY PATHOLOGIST: CPT | Performed by: INTERNAL MEDICINE

## 2018-08-31 PROCEDURE — 76060000031 HC ANESTHESIA FIRST 0.5 HR: Performed by: INTERNAL MEDICINE

## 2018-08-31 PROCEDURE — 77030019988 HC FCPS ENDOSC DISP BSC -B: Performed by: INTERNAL MEDICINE

## 2018-08-31 PROCEDURE — 82962 GLUCOSE BLOOD TEST: CPT

## 2018-08-31 PROCEDURE — 74011250636 HC RX REV CODE- 250/636: Performed by: INTERNAL MEDICINE

## 2018-08-31 PROCEDURE — 87077 CULTURE AEROBIC IDENTIFY: CPT | Performed by: INTERNAL MEDICINE

## 2018-08-31 RX ORDER — FENTANYL CITRATE 50 UG/ML
100 INJECTION, SOLUTION INTRAMUSCULAR; INTRAVENOUS ONCE
Status: DISCONTINUED | OUTPATIENT
Start: 2018-08-31 | End: 2018-08-31 | Stop reason: HOSPADM

## 2018-08-31 RX ORDER — SODIUM CHLORIDE 0.9 % (FLUSH) 0.9 %
5-10 SYRINGE (ML) INJECTION EVERY 8 HOURS
Status: DISCONTINUED | OUTPATIENT
Start: 2018-08-31 | End: 2018-08-31 | Stop reason: HOSPADM

## 2018-08-31 RX ORDER — ATROPINE SULFATE 0.1 MG/ML
0.5 INJECTION INTRAVENOUS
Status: DISCONTINUED | OUTPATIENT
Start: 2018-08-31 | End: 2018-08-31 | Stop reason: HOSPADM

## 2018-08-31 RX ORDER — DEXTROMETHORPHAN/PSEUDOEPHED 2.5-7.5/.8
1.2 DROPS ORAL
Status: DISCONTINUED | OUTPATIENT
Start: 2018-08-31 | End: 2018-08-31 | Stop reason: HOSPADM

## 2018-08-31 RX ORDER — PROPOFOL 10 MG/ML
INJECTION, EMULSION INTRAVENOUS AS NEEDED
Status: DISCONTINUED | OUTPATIENT
Start: 2018-08-31 | End: 2018-08-31 | Stop reason: HOSPADM

## 2018-08-31 RX ORDER — DEXTROSE MONOHYDRATE AND SODIUM CHLORIDE 5; .9 G/100ML; G/100ML
100 INJECTION, SOLUTION INTRAVENOUS CONTINUOUS
Status: DISCONTINUED | OUTPATIENT
Start: 2018-08-31 | End: 2018-08-31 | Stop reason: HOSPADM

## 2018-08-31 RX ORDER — FLUMAZENIL 0.1 MG/ML
0.2 INJECTION INTRAVENOUS
Status: DISCONTINUED | OUTPATIENT
Start: 2018-08-31 | End: 2018-08-31 | Stop reason: HOSPADM

## 2018-08-31 RX ORDER — GLYCOPYRROLATE 0.2 MG/ML
INJECTION INTRAMUSCULAR; INTRAVENOUS AS NEEDED
Status: DISCONTINUED | OUTPATIENT
Start: 2018-08-31 | End: 2018-08-31 | Stop reason: HOSPADM

## 2018-08-31 RX ORDER — SODIUM CHLORIDE 0.9 % (FLUSH) 0.9 %
5-10 SYRINGE (ML) INJECTION AS NEEDED
Status: DISCONTINUED | OUTPATIENT
Start: 2018-08-31 | End: 2018-08-31 | Stop reason: HOSPADM

## 2018-08-31 RX ORDER — NALOXONE HYDROCHLORIDE 0.4 MG/ML
0.4 INJECTION, SOLUTION INTRAMUSCULAR; INTRAVENOUS; SUBCUTANEOUS
Status: DISCONTINUED | OUTPATIENT
Start: 2018-08-31 | End: 2018-08-31 | Stop reason: HOSPADM

## 2018-08-31 RX ORDER — LORAZEPAM 2 MG/ML
2 INJECTION INTRAMUSCULAR AS NEEDED
Status: DISCONTINUED | OUTPATIENT
Start: 2018-08-31 | End: 2018-08-31 | Stop reason: HOSPADM

## 2018-08-31 RX ORDER — DIPHENHYDRAMINE HYDROCHLORIDE 50 MG/ML
50 INJECTION, SOLUTION INTRAMUSCULAR; INTRAVENOUS ONCE
Status: DISCONTINUED | OUTPATIENT
Start: 2018-08-31 | End: 2018-08-31 | Stop reason: HOSPADM

## 2018-08-31 RX ORDER — EPINEPHRINE 0.1 MG/ML
1 INJECTION INTRACARDIAC; INTRAVENOUS
Status: DISCONTINUED | OUTPATIENT
Start: 2018-08-31 | End: 2018-08-31 | Stop reason: HOSPADM

## 2018-08-31 RX ORDER — LIDOCAINE HYDROCHLORIDE 20 MG/ML
INJECTION, SOLUTION EPIDURAL; INFILTRATION; INTRACAUDAL; PERINEURAL AS NEEDED
Status: DISCONTINUED | OUTPATIENT
Start: 2018-08-31 | End: 2018-08-31 | Stop reason: HOSPADM

## 2018-08-31 RX ORDER — SODIUM CHLORIDE 9 MG/ML
100 INJECTION, SOLUTION INTRAVENOUS CONTINUOUS
Status: DISCONTINUED | OUTPATIENT
Start: 2018-08-31 | End: 2018-08-31 | Stop reason: HOSPADM

## 2018-08-31 RX ORDER — LIDOCAINE HYDROCHLORIDE 20 MG/ML
5 SOLUTION OROPHARYNGEAL AS NEEDED
Status: DISCONTINUED | OUTPATIENT
Start: 2018-08-31 | End: 2018-08-31 | Stop reason: HOSPADM

## 2018-08-31 RX ORDER — MIDAZOLAM HYDROCHLORIDE 1 MG/ML
5 INJECTION, SOLUTION INTRAMUSCULAR; INTRAVENOUS
Status: DISCONTINUED | OUTPATIENT
Start: 2018-08-31 | End: 2018-08-31 | Stop reason: HOSPADM

## 2018-08-31 RX ADMIN — PROPOFOL 20 MG: 10 INJECTION, EMULSION INTRAVENOUS at 10:12

## 2018-08-31 RX ADMIN — PROPOFOL 30 MG: 10 INJECTION, EMULSION INTRAVENOUS at 10:08

## 2018-08-31 RX ADMIN — PROPOFOL 100 MG: 10 INJECTION, EMULSION INTRAVENOUS at 10:06

## 2018-08-31 RX ADMIN — GLYCOPYRROLATE 0.2 MG: 0.2 INJECTION INTRAMUSCULAR; INTRAVENOUS at 10:05

## 2018-08-31 RX ADMIN — LIDOCAINE HYDROCHLORIDE 100 MG: 20 INJECTION, SOLUTION EPIDURAL; INFILTRATION; INTRACAUDAL; PERINEURAL at 10:06

## 2018-08-31 RX ADMIN — PROPOFOL 10 MG: 10 INJECTION, EMULSION INTRAVENOUS at 10:15

## 2018-08-31 RX ADMIN — SODIUM CHLORIDE 100 ML/HR: 900 INJECTION, SOLUTION INTRAVENOUS at 09:52

## 2018-08-31 NOTE — PROGRESS NOTES
Anesthesia reports 160mg Propofol, 100mg Lidocaine. 0,.2mg Robinul and 450mL NS given during procedure. Received report from anesthesia staff on vital signs and status of patient.

## 2018-08-31 NOTE — ROUTINE PROCESS
Hi Caitie 1950 
[id number] Situation: 
Verbal report received from: Yomi Whaley Procedure: Procedure(s): ESOPHAGOGASTRODUODENOSCOPY (EGD) ESOPHAGOGASTRODUODENAL (EGD) BIOPSY Background: 
 
Preoperative diagnosis: GERD/DYSPHAGIA Postoperative diagnosis: Gastritis, Gastric erosions, gastric nodules :  Dr. Gianni Arreola Assistant(s): Endoscopy RN-1: Zahida Ash Patient, RN Specimens:  
ID Type Source Tests Collected by Time Destination 1 : Gastric noduls Preservative Gastric  Brooklyn Kirkpatrick MD 8/31/2018 1015 Pathology H. Pylori  yes Assessment: 
Intra-procedure medications Anesthesia gave intra-procedure sedation and medications, see anesthesia flow sheet yes Intravenous fluids: NS@ Lambert Caper Vital signs stable Abdominal assessment: round and soft Recommendation: 
Discharge patient per MD order. Return to floor Family or Friend Permission to share finding with family or friend yes

## 2018-08-31 NOTE — ANESTHESIA PREPROCEDURE EVALUATION
Anesthetic History No history of anesthetic complications Review of Systems / Medical History Patient summary reviewed, nursing notes reviewed and pertinent labs reviewed Pulmonary Smoker Comments: Former Smoker - 1.5 pack years Neuro/Psych Within defined limits Cardiovascular Hypertension Hyperlipidemia Exercise tolerance: >4 METS 
  
GI/Hepatic/Renal 
  
GERD Renal disease: stones Comments: Hx colon polyps Endo/Other Diabetes Hypothyroidism Arthritis and anemia Other Findings Comments: Osteoarthritis DJD Physical Exam 
 
Airway Mallampati: III Neck ROM: normal range of motion Mouth opening: Normal 
 
 Cardiovascular Regular rate and rhythm,  S1 and S2 normal,  no murmur, click, rub, or gallop Dental 
 
Dentition: Caps/crowns Comments: No loose teeth Pulmonary Breath sounds clear to auscultation Abdominal 
GI exam deferred Other Findings Anesthetic Plan ASA: 2 Anesthesia type: total IV anesthesia Induction: Intravenous Anesthetic plan and risks discussed with: Patient

## 2018-08-31 NOTE — IP AVS SNAPSHOT
[address] [facility] 
[phone] Patient: Hi Blood MRN: [id number] XON:9/1/4092 About your hospitalization You were admitted on:  August 31, 2018 You last received care in the:  [facility] [place] END[facility] You were discharged on:  August 31, 2018 Why you were hospitalized Your primary diagnosis was:  Not on File Follow-up Information Follow up With Details Comments Contact Info Isidro Ashley MD   [address] [place] [address] [address] [facility] [place] [address] 
[phone] Discharge Orders None A check huang indicates which time of day the medication should be taken. My Medications CONTINUE taking these medications Instructions Each Dose to Equal  
 Morning Noon Evening Bedtime CALCIUM 600 + D 600-125 mg-unit Tab Generic drug:  calcium-cholecalciferol (d3) Your last dose was: Your next dose is: Take 2 tablets by mouth daily. 2 Tab EXFORGE  mg per tablet Generic drug:  amLODIPine-valsartan Your last dose was: Your next dose is: Take 1 Tab by mouth daily. 1 Tab ELVIRA EXTRACT 250 mg Cap Generic drug:  elvira (Zingiber officinalis) Your last dose was: Your next dose is: Take 1 Tab by mouth daily. 1 Tab  
    
   
   
   
  
 metFORMIN 500 mg tablet Commonly known as:  GLUCOPHAGE Your last dose was: Your next dose is: Take 500 mg by mouth nightly. 500 mg PROBIOTIC 4X 10-15 mg Tbec Generic drug:  B.infantis-B.ani-B.long-B.bifi Your last dose was: Your next dose is: Take 1 capsule by mouth daily. 1 Cap  
    
   
   
   
  
 simvastatin 40 mg tablet Commonly known as:  ZOCOR Your last dose was: Your next dose is: Take 40 mg by mouth nightly. 40 mg SYNTHROID 100 mcg tablet Generic drug:  levothyroxine Your last dose was: Your next dose is: Take 100 mcg by mouth daily. Monday-Saturday only 100 mcg  
    
   
   
   
  
 triamterene-hydroCHLOROthiazide 37.5-25 mg per capsule Commonly known as:  Delpha Covert Your last dose was: Your next dose is: Take 0.5 Caps by mouth daily. Indications: hypertension  
 0.5 Cap TURMERIC (BULK) Your last dose was: Your next dose is: Take 1 Tab by mouth daily. 1 Tab ZANTAC 150 mg tablet Generic drug:  raNITIdine Your last dose was: Your next dose is: Take 150 mg by mouth two (2) times daily as needed. 150 mg Discharge Instructions Endoscopy Discharge Instructions Dr. Joy Herrera [address] [facility] office  NAME: Chloe John RECORD [id number]:[id number] AGE:  74 y.o. YOB: 1950 FINAL Discharge Procedure and Diagnosis:   
  
Procedure(s): ESOPHAGOGASTRODUODENOSCOPY (EGD) FINDINGS:    
Gastritis No esophageal blockage MEDICATIONS [x] CONTINUE CURRENT MEDICATIONS [] NEW MEDICATIONS 1.  
 2.  
 3.  
   
 
Testing Schedule       
        
 
   []  Repeat colonoscopy in 6-12 month 2nd        to Inadequate  prep  
 []  Repeat colonoscopy in 3 years  
 []  Repeat colonoscopy in 5 years  
 []  Repeat colonoscopy in 10 years New additional 
Tests Call the office  
([phone]) for the appointment time    
 [x]   esophageal manometry if symptoms persists  
 
 []  
 
 [] YOUR NEXT APPOINTMENT WITH DR John Tatum:   
                                                                                                                 
 
 
    
 []   None follow up with pcp []  1 week [x]   2 week  
 []  1 month Always keep Alanis Kern MD for regular medical follow up If you had a colonoscopy the \"C\" indicates specific instructions    
  
x                                           Diet Instructions : 
 Ordinarily you may resume your previous diet but your initial diet should be       Light your discharge nurse will go over this with you. Large meals can cause  abdominal discomfort after these procedures. Specific Diet Recommendations:  
     [x] High fiber diet. https://www.Your Image by Brooke/. [employer]diets/ 
 
    [] GERD diet: avoid fried and fatty foods, peppermint, chocolate, alcohol,    
          coffee, citrus fruits and juices, and tomato products. Avoid lying down for 
          2 to 3  hours after eating. https://www.salinas.com/. [employer]diets/      
     []  FODMAP DIET  DeathUnit.nl      
 
     []  All diets eg high fiber, gastroparesis. , weight loss , gluten free 1. [employer] 2.  https://www.Your Image by Brooke/. [employer]diets/  
       
__x__  Delfina Seay may feel quite tired and need to rest and recuperate for several hours    following these procedures. __x__  Due to the fact that sedation was administered for this procedure, do not drive,   operate machinery or sign legal documents for the next 24 hours.         
__x__  Mild abdominal pain may be experienced after your procedure, but is should   disappear after several hours. Notify your physician if you have persistent pain,   tenderness or abdominal distension. __x__  C Many patients for the first few hours following the exam may experience         belching or passing gas through the rectum. Walking may help to relieve      
 distention and gas pains. A warm bath or shower will often help with abdominal  cramping.                                                                                        
  
__x__   Ian Frederick may return to your normal routine tomorrow, according to how you feel        and depending on your doctors instructions. Be sure to call your doctor to make  an appointment for a post-surgery check-up on the date your doctor has   requested. __x__ C Rectal bleeding or spotting in small amounts may occur with the first bowel   movement following a colonoscopy or sigmoidoscopy. If a large amount of blood is noted call immediately __x__  Ian Frederick may experience a numbness or lack of sensation in throat. If present, do not   
 eat or drink. Before eating, test your ability to drink with small sips of water. Y     You may try clear liquids or soups. If you tolerate these, you may then eat solid     food which is not greasy or spicy. __x__ C   
 IF POLYPS REMOVED: Avoid any blood thinning medication such as plavix,   aspirin or coumadin  NSAIDS (like advil or alleve) for 7 days. __x__  Notify your physician if you cough or vomit blood or experience chest pain. Your biopsy or testing result should be available in 7-10 days Prescription will be electronically sent to your pharmacy you must  
  let your nurse know your pharmacy: THE ABOVE INSTRUCTIONS HAVE BEEN EXPLAINED TO ME  
    TO MY SATISFACTION. TO HELP ENSURE A SMOOTH RECOVERY,  
    IT IS IMPORTANT TO FOLLOW THEM. _x___Pamphlet /Educational Information provided for diagnostic findings Additional education information can assessed at the sites below: 
 [employer] 
 http://www.digestive. niddk.nih.gov/ddiseases/a-z.asp Web MD patient information Signature of individual given instructions : 
 Date: 8/31/2018 Introducing [facility]! Niels Rainesry introduces Wayin patient portal. Now you can access parts of your medical record, email your doctor's office, and request medication refills online. 1. In your internet browser, go to [email or web address]://[email or web address] Mobile. [employer]/Yeeply Mobile 2. Click on the First Time User? Click Here link in the Sign In box. You will see the New Member Sign Up page. 3. Enter your Wayin Access Code exactly as it appears below. You will not need to use this code after youve completed the sign-up process. If you do not sign up before the expiration date, you must request a new code. · Wayin Access Code: [email or web address] Expires: 11/29/2018  9:04 AM 
 
4. Enter the last four digits of your Social Security Number (xxxx) and Date of Birth (mm/dd/yyyy) as indicated and click Submit. You will be taken to the next sign-up page. 5. Create a Vantrixt ID. This will be your Wayin login ID and cannot be changed, so think of one that is secure and easy to remember. 6. Create a Vantrixt password. You can change your password at any time. 7. Enter your Password Reset Question and Answer. This can be used at a later time if you forget your password. 8. Enter your e-mail address. You will receive e-mail notification when new information is available in [address] 9. Click Sign Up. You can now view and download portions of your medical record. 10. Click the Download Summary menu link to download a portable copy of your medical information. If you have questions, please visit the Frequently Asked Questions section of the WizMeta website. Remember, WizMeta is NOT to be used for urgent needs. For medical emergencies, dial 911. Now available from your iPhone and Android! Introducing Shaan Mendoza As a [employer] patient, I wanted to make you aware of our electronic visit tool called Shaan Mendoza. [employer] 24/7 allows you to connect within minutes with a medical provider 24 hours a day, seven days a week via a mobile device or tablet or logging into a secure website from your computer. You can access Shaan Mendoza from anywhere in the [place]. A virtual visit might be right for you when you have a simple condition and feel like you just dont want to get out of bed, or cant get away from work for an appointment, when your regular [employer] provider is not available (evenings, weekends or holidays), or when youre out of town and need minor care. Electronic visits cost only $49 and if the [employer] 24/7 provider determines a prescription is needed to treat your condition, one can be electronically transmitted to a nearby pharmacy*. Please take a moment to enroll today if you have not already done so. The enrollment process is free and takes just a few minutes. To enroll, please download the [employer] 24/7 estephania to your tablet or phone, or visit [email or web address] to enroll on your computer.    
And, as an [address] [place] [address] patient with a [employer] account, the results of your visits will be scanned into your electronic medical record and your primary care provider will be able to view the scanned results. We urge you to continue to see your regular [employer] provider for your ongoing medical care. And while your primary care provider may not be the one available when you seek a Shaan Mendoza virtual visit, the peace of mind you get from getting a real diagnosis real time can be priceless. For more information on Shaan Cancinofin, view our Frequently Asked Questions (FAQs) at [email or web address]. Sincerely, 
 
Nneka Cuellar MD 
Chief Medical Officer [address] *:  certain medications cannot be prescribed via Shaan Julitafin Providers Seen During Your Hospitalization Provider Specialty Primary office phone Effie Yadav MD Internal Medicine [phone] Your Primary Care Physician (PCP) Primary Care Physician Office Phone Office Fax Corina Whipple [phone] You are allergic to the following No active allergies Recent Documentation Height Weight Breastfeeding? BMI OB Status Smoking Status 1.626 m 64.4 kg No 24.36 kg/m2 Hysterectomy Former Smoker Emergency Contacts Name Discharge Info Relation Home Work Mobile Christelle Becerril DISCHARGE CAREGIVER [3] Daughter [21] [phone] Liza Redman DISCHARGE CAREGIVER [3] Daughter [21] [phone] Patient Belongings The following personal items are in your possession at time of discharge: 
  Dental Appliances: None  Visual Aid: Glasses, With patient Please provide this summary of care documentation to your next provider. Signatures-by signing, you are acknowledging that this After Visit Summary has been reviewed with you and you have received a copy.   
  
 
  
    
    
 Patient Signature: ____________________________________________________________ Date:  ____________________________________________________________  
  
Orbie Marquis Provider Signature:  ____________________________________________________________ Date:  ____________________________________________________________

## 2018-08-31 NOTE — PROCEDURES
G I Procedure Note                         EGD    Dr. Debo Pickard office   [facility]                              [id number]                                 [id number]   1950                       74 y.o.                female        Procedure Date: 8/31/2018   Procedure  EGD  with biopsy. Pre Op Diagnosis:                     1. GERD/DYSPHAGIA                                                                                                                                                                          Post Op Diagnosis:                    1.   Gastric erosions, minute gastric nodules                                                             H&p completed  Yes    Anesthesia Assessment Performed prior to procedure:No change   Medications Medication Record            Description of Procedure:  Hi Blood  was seen in the endoscopy suite and the pre procedure evaluation was completed. The patient was identified as Hi Blood  and the procedure verified as EGD with biopsy. A Time Out was held and the above information confirmed. The risks, benefits, complications, treatment options and expected outcomes were discussed with the patient. The possibilities of reaction to medication, pulmonary aspiration, perforation of a viscus, bleeding, failure to diagnose a condition and creating a complication requiring transfusion or operation were discussed with the patient who  Permit obtained and risks explained ( [address])     Procedure Note:  With the patient in the left lateral position and after appropriate conscious anesthesia the Olympus Video endoscope was passed under direct vision into the oropharynx.   The oropharynx appeared Normal   The instrument was advanced into the esophagus and the findings were   normal appearing esophageal mucosa   otherwise a normal anatomy. The instrument was advanced into the stomach through the esophagogastric junction. The gastroscope was advanced progressively through the stomach visualizing the body and antral areas. The findings were a minute gastric nodule 1-2 mm upper body of the stomach and moderate gastritis with erosions. A biopsy was obtained. The instrument was further advanced through the pylorus into the duodenum. The bulb of the duodenum and the second portion of the duodenum were examined and the findings were a smooth appearing duodenal mucosa with mild erythema. The endoscope was withdrawn back into the stomach and retroflexed with examination of the fundus and cardia and the findings were no additional abnormalities . The instrument was withdrawn back into the esophagus and the the finding were no additional abnormalities    Biopsies were obtained for helicobacter testing and pathologic analysis from the representative areas. The endoscope was completely withdrawn and the patient tolerated the procedure well. 1.  Blood loss was nominal.  2.  For biopsy  Specimen verification by physician and nurse two sources name, social security number    Suggestions  Plan 1. - Acid suppression with a proton pump inhibitor.  - Await pathology. - Await HARVEY test result and treat for Helicobacter pylori if positive. - esophageal manometry if symptoms persists       Dexter Oliver MD

## 2018-08-31 NOTE — DISCHARGE INSTRUCTIONS
Endoscopy Discharge Instructions     Dr. Haley  office                                            NAME: Stormy Whittaker RECORD CHJHSP:[id number]    AGE:  74 y.o. YOB: 1950                                                              FINAL Discharge Procedure and Diagnosis:       Procedure(s):  ESOPHAGOGASTRODUODENOSCOPY (EGD)       FINDINGS:     Gastritis   No esophageal blockage                                          MEDICATIONS    [x] CONTINUE CURRENT MEDICATIONS     [] NEW MEDICATIONS           1.    2.    3.         Testing   Schedule                      []  Repeat colonoscopy in 6-12 month 2nd        to Inadequate  prep    []  Repeat colonoscopy in 3 years    []  Repeat colonoscopy in 5 years    []  Repeat colonoscopy in 10 years         New additional  Tests  Call the office   [phone] for the appointment time      [x]   esophageal manometry if symptoms persists      []      []                                     YOUR NEXT APPOINTMENT WITH DR Radha Nolasco:                                                                                                                                []   None follow up with pcp   []  1 week       [x]   2 week    []  1 month    Always keep Tavon Richard MD for regular medical follow up                                                                                                                         If you had a colonoscopy the \"C\" indicates specific instructions        x                                           Diet Instructions :   Ordinarily you may resume your previous diet but your initial diet should be       Light your discharge nurse will go over this with you. Large meals can cause  abdominal discomfort after these procedures.                                                                            Specific Diet Recommendations:        [x] High fiber diet. https://www.Lono. com/diets/        [] GERD diet: avoid fried and fatty foods, peppermint, chocolate, alcohol,               coffee, citrus fruits and juices, and tomato products. Avoid lying down for            2 to 3  hours after eating. https://www.salinas.com/. com/diets/            []  FODMAP DIET  Deathit.nl              []  All diets eg high fiber, gastroparesis. , weight loss , gluten free             1. Project 10K              2.  https://www.Lono. BuySimple/diets/           __x__  Osorio Douse may feel quite tired and need to rest and recuperate for several hours    following these procedures. __x__  Due to the fact that sedation was administered for this procedure, do not drive,   operate machinery or sign legal documents for the next 24 hours. __x__  Mild abdominal pain may be experienced after your procedure, but is should   disappear after several hours. Notify your physician if you have persistent pain,   tenderness or abdominal distension. __x__  C    Many patients for the first few hours following the exam may experience         belching or passing gas through the rectum. Walking may help to relieve        distention and gas pains. A warm bath or shower will often help with abdominal  cramping.                                                                                            __x__   Osorio Douse may return to your normal routine tomorrow, according to how you feel        and depending on your doctors instructions. Be sure to call your doctor to make  an appointment for a post-surgery check-up on the date your doctor has   requested. __x__ C     Rectal bleeding or spotting in small amounts may occur with the first bowel   movement following a colonoscopy or sigmoidoscopy.  If a large amount of blood is noted call immediately     __x__  You may experience a numbness or lack of sensation in throat. If present, do not     eat or drink. Before eating, test your ability to drink with small sips of water. Y     You may try clear liquids or soups. If you tolerate these, you may then eat solid     food which is not greasy or spicy. __x__ C     IF POLYPS REMOVED: Avoid any blood thinning medication such as plavix,   aspirin or coumadin  NSAIDS (like advil or alleve) for 7 days. __x__  Notify your physician if you cough or vomit blood or experience chest pain. Your biopsy or testing result should be available in 7-10 days                                                                                                                      Prescription will be electronically sent to your pharmacy you must     let your nurse know your pharmacy:                                                                                                                                          [address] [place] [address]. TO HELP ENSURE A SMOOTH RECOVERY,       IT IS IMPORTANT TO FOLLOW THEM. _x___Pamphlet /Educational Information provided for diagnostic findings     Additional education information can assessed at the sites below:   [employer]el   http://www.digestive. niddk.nih.gov/ddiseases/a-z.asp      Web MD patient information                                                                                                Signature of individual given instructions :   Date: 8/31/2018

## 2018-08-31 NOTE — H&P
G I Procedure Note Endoscopy History and Physical  
 
 
       
Dr. Loetta Homans [address] Office  [phone] Dilcia Paz [id number]  [id number]  74 y.o.  female Date of Procedure:  
Preoperative Diagnosis:    
 
Procedure:   8/31/2018 GERD/DYSPHAGIA Procedure(s): ESOPHAGOGASTRODUODENOSCOPY (EGD) Gastroenterologist: Anesthesia:    
      Loetta Homans, MD                        
      MAC History and procedure indication: 
Dilcia Paz is a 74 y.o. BLACK OR  female who presents with: GERD/DYSPHAGIA   including the additional history of Dysphagia and Heartburn ,,,   
 
 
Past Medical History:  
Diagnosis Date  Arthritis   
 knees  Colon polyp  Diabetes ([facility] [address] NIDDM  GERD (gastroesophageal reflux disease)  High cholesterol  Hyperlipemia  Hypertension  Hypothyroid  Kidney stones Prior to Admission medications Medication Sig Start Date End Date Taking? Authorizing Provider  
ginger, Zingiber officinalis, (GINGER EXTRACT) 250 mg cap Take 1 Tab by mouth daily. Yes Historical Provider TURMERIC, BULK, Take 1 Tab by mouth daily. Yes Historical Provider  
raNITIdine (ZANTAC) 150 mg tablet Take 150 mg by mouth two (2) times daily as needed. Yes Historical Provider  
calcium-cholecalciferol, d3, (CALCIUM 600 + D) 600-125 mg-unit tab Take 2 tablets by mouth daily. Yes Historical Provider B.infantis-B.ani-B.long-B.bifi (PROBIOTIC 4X) 10-15 mg TbEC Take 1 capsule by mouth daily. Yes Historical Provider  
amlodipine-valsartan (EXFORGE)  mg per tablet Take 1 Tab by mouth daily. Yes Historical Provider  
metformin (GLUCOPHAGE) 500 mg tablet Take 500 mg by mouth nightly. Yes Historical Provider triamterene-hydrochlorothiazide (DYAZIDE) 37.5-25 mg per capsule Take 0.5 Caps by mouth daily. Indications: hypertension   Yes Historical Provider  
levothyroxine (SYNTHROID) 100 mcg tablet Take 100 mcg by mouth daily. Monday-Saturday only   Yes Historical Provider  
simvastatin (ZOCOR) 40 mg tablet Take 40 mg by mouth nightly. Yes Historical Provider No Known Allergies Past Surgical History:  
Procedure Laterality Date  COLONOSCOPY N/A 4/25/2018 COLONOSCOPY performed by Kristine Salcido MD at [facility] 91 HX BREAST BIOPSY Right ~2010  
 cyst removed  HX CARPAL TUNNEL RELEASE Bilateral   
 HX COLONOSCOPY    
 HX HEENT    
 cyst on parathyroid removed  HX HYSTERECTOMY  2000's  
 partial hysterectomy  HX KNEE REPLACEMENT Left 2014 Partial Knee Replacement  HX KNEE REPLACEMENT Right 06/2017 Family History Problem Relation Age of Onset  Heart Disease Mother  Heart Disease Father MI  
 Asthma Father  MS Sister  Heart Disease Brother  Anesth Problems Neg Hx Social History Substance Use Topics  Smoking status: Former Smoker Packs/day: 0.50 Years: 3.00 Quit date: 1/1/1991  Smokeless tobacco: Never Used  Alcohol use No  
           
      
  
 
                           PHYSICAL EXAM 
  
Visit Vitals  LMP  (LMP Unknown) General appearance:  alert, well appearing, and in no distress Mental status:  normal mood, behavior, speech, dress, motor activity and thought processes Nose:      normal and patent, no erythema, discharge or polyps Mouth:- mucous membranes moist, pharynx normal without lesions [x]  No Loose teeth      []    Loose teeth Finger opening:  []1     []1.5    [] 2     [] 2.5     [x] 3      [] 3.5     [] 4 Mallampati:         [] Class 1     [x] Class 2    [] Class 3      [] Class 4 Neck - supple,      [x] Full ROM [] Decreased ROM  [] Short Neck no significant adenopathy Chest - clear to auscultation, no wheezes, rales or rhonchi, symmetric air entry Heart: normal rate, regular rhythm, normal S1, S2, no murmurs, rubs, clicks or gallops Abdomen: abdomen soft, bowel sounds  [x] normal  [] increased  [] hypoactive 
                     [] no tenderness  [] epigastric tenderness  [] LLQ tenderness   [] RLQ tenderness No masses, organomegaly or guarding. Rectal exam: negative without mass, lesions or tenderness Extremities: peripheral pulses normal, no pedal edema, no clubbing or cyanosis Neurologic: Alert and oriented to person, place, and time; normal strength and tone. Normal symmetric reflexes  Normal gait: 
 
           
  
             
     Assessement:   
                             Pre op dx:  GERD/DYSPHAGIA Additional medical problems list below Patient Active Problem List  
Diagnosis Code  Left knee DJD M17.12  
 Osteoarthritis of right knee M17.11  
 Encounter for colonoscopy due to history of adenomatous colonic polyps Z12.11, Z86.010 This note documentation was performed prior to this planned procedure  
    after a history and physical was performed in the office. Date: 8 7 25 Pre Procedure Evaluation (per anesthesia or per h&p) Sedation/Assessment:   
                                                   
                                       Mallampati Classification []Class 1                    []Class 2                    [] Class 3                  [] Class 4 ASA classfication 
       []     Class I: Normally healthy 
       []     Class II: Patient with mild systemic disease (e.g. hypertension) []     Class III: Patient with severe systemic disease (e.g. CHF), non-decompensated []     Class IV: Patient with severe systemic disease, decompensated 
       []     Class V: Moribund patient, survival unlikely Plan:  [x]  Egd  
                             [] Colonoscopy 
                             [] with Moderate Sedation /Conscious Sedation   
                             [x] MAC Patient stable for planned procedure. See orders.  
   Cb Muñiz MD

## 2018-08-31 NOTE — ANESTHESIA POSTPROCEDURE EVALUATION
Post-Anesthesia Evaluation and Assessment Patient: Lisa Keith MRN: [id number]  SSN: [id number] YOB: 1950  Age: 74 y.o. Sex: female Cardiovascular Function/Vital Signs Visit Vitals  /77  Pulse 81  Resp 21  
 Ht 5' 4\" (1.626 m)  Wt 64.4 kg (141 lb 14.4 oz)  SpO2 100%  Breastfeeding No  
 BMI 24.36 kg/m2 Patient is status post total IV anesthesia anesthesia for Procedure(s): ESOPHAGOGASTRODUODENOSCOPY (EGD) ESOPHAGOGASTRODUODENAL (EGD) BIOPSY. Nausea/Vomiting: None Postoperative hydration reviewed and adequate. Pain: 
Pain Scale 1: Numeric (0 - 10) (08/31/18 1022) Pain Intensity 1: 0 (08/31/18 1022) Managed Neurological Status: At baseline Mental Status and Level of Consciousness: Arousable Pulmonary Status:  
O2 Device: Nasal cannula (08/31/18 1022) Adequate oxygenation and airway patent Complications related to anesthesia: None Post-anesthesia assessment completed. No concerns Signed By: Jonatan Wheeler MD   
 August 31, 2018

## 2019-05-02 ENCOUNTER — HOSPITAL ENCOUNTER (OUTPATIENT)
Dept: MAMMOGRAPHY | Age: 69
Discharge: HOME | End: 2019-05-02
Attending: FAMILY MEDICINE
Payer: MEDICARE

## 2019-05-02 DIAGNOSIS — Z12.39 BREAST SCREENING, UNSPECIFIED: ICD-10-CM

## 2019-05-02 PROCEDURE — 77067 SCR MAMMO BI INCL CAD: CPT

## 2019-09-24 PROBLEM — Z12.11 ENCOUNTER FOR COLONOSCOPY DUE TO HISTORY OF ADENOMATOUS COLONIC POLYPS: Status: RESOLVED | Noted: 2018-04-25 | Resolved: 2019-09-24

## 2019-09-24 PROBLEM — Z86.010 ENCOUNTER FOR COLONOSCOPY DUE TO HISTORY OF ADENOMATOUS COLONIC POLYPS: Status: RESOLVED | Noted: 2018-04-25 | Resolved: 2019-09-24

## 2020-05-29 ENCOUNTER — HOSPITAL ENCOUNTER (OUTPATIENT)
Dept: MAMMOGRAPHY | Age: 70
Discharge: HOME | End: 2020-05-29
Attending: FAMILY MEDICINE
Payer: MEDICARE

## 2020-05-29 DIAGNOSIS — Z12.31 VISIT FOR SCREENING MAMMOGRAM: ICD-10-CM

## 2020-05-29 PROCEDURE — 77067 SCR MAMMO BI INCL CAD: CPT

## 2020-08-25 ENCOUNTER — HOSPITAL ENCOUNTER (OUTPATIENT)
Dept: ULTRASOUND IMAGING | Age: 70
Discharge: HOME | End: 2020-08-25
Attending: OTOLARYNGOLOGY
Payer: MEDICARE

## 2020-08-25 DIAGNOSIS — Z00.8 OTHER SPECIFIED GENERAL MEDICAL EXAMINATION: ICD-10-CM

## 2020-08-25 DIAGNOSIS — R22.1 MASS OF NECK: ICD-10-CM

## 2020-08-25 DIAGNOSIS — K11.20 SIALOADENITIS: ICD-10-CM

## 2020-08-25 DIAGNOSIS — Z78.9 NON-SMOKER: ICD-10-CM

## 2020-08-25 PROCEDURE — 76536 US EXAM OF HEAD AND NECK: CPT

## 2020-10-21 ENCOUNTER — HOSPITAL ENCOUNTER (EMERGENCY)
Age: 70
Discharge: HOME | End: 2020-10-21
Attending: STUDENT IN AN ORGANIZED HEALTH CARE EDUCATION/TRAINING PROGRAM
Payer: MEDICARE

## 2020-10-21 ENCOUNTER — APPOINTMENT (OUTPATIENT)
Dept: GENERAL RADIOLOGY | Age: 70
End: 2020-10-21
Attending: PHYSICIAN ASSISTANT
Payer: MEDICARE

## 2020-10-21 VITALS
SYSTOLIC BLOOD PRESSURE: 151 MMHG | WEIGHT: 139.11 LBS | DIASTOLIC BLOOD PRESSURE: 71 MMHG | BODY MASS INDEX: 23.75 KG/M2 | TEMPERATURE: 98.9 F | HEART RATE: 76 BPM | OXYGEN SATURATION: 100 % | HEIGHT: 64 IN | RESPIRATION RATE: 17 BRPM

## 2020-10-21 DIAGNOSIS — M25.512 ACUTE PAIN OF LEFT SHOULDER: Primary | ICD-10-CM

## 2020-10-21 PROCEDURE — 99282 EMERGENCY DEPT VISIT SF MDM: CPT

## 2020-10-21 PROCEDURE — 73030 X-RAY EXAM OF SHOULDER: CPT

## 2020-10-21 RX ORDER — NAPROXEN 500 MG/1
500 TABLET ORAL
Qty: 20 TAB | Refills: 0 | Status: SHIPPED | OUTPATIENT
Start: 2020-10-21

## 2020-10-21 RX ORDER — LISINOPRIL 10 MG/1
10 TABLET ORAL DAILY
COMMUNITY

## 2020-10-21 RX ORDER — CYCLOBENZAPRINE HCL 10 MG
10 TABLET ORAL
Qty: 20 TAB | Refills: 0 | Status: SHIPPED | OUTPATIENT
Start: 2020-10-21

## 2020-10-21 NOTE — ED PROVIDER NOTES
EMERGENCY DEPARTMENT HISTORY AND PHYSICAL EXAM      Date: 10/21/2020  Patient Name: Antony Baez    History of Presenting Illness     Chief Complaint   Patient presents with    Shoulder Pain     Pt having left shoulder pain since last week. Pt denies any injury/trauma. Pt denies CP or SOB. History Provided By: Patient    HPI: Antony Baez, 78 y.o. female with significant PMHx for HTN and DM, presents by POV to the ED with cc of non-traumatic left shoulder pain for the past week. The pain is a constant and aching pain that at times will radiate down her arm to the elbow. The pain is exacerbated with movement. She denies CP, SOB, palpitations, neck pain, and headache. There has been no lightheadedness or dizziness. She has taken Tylenol arthritis with some relief. There are no other complaints, changes, or physical findings at this time. Social Hx: Tobacco (denies), EtOH (denies), Illicit drug use (denies)     PCP: Isabela Agrawal NP    No current facility-administered medications on file prior to encounter. Current Outpatient Medications on File Prior to Encounter   Medication Sig Dispense Refill    lisinopriL (PRINIVIL, ZESTRIL) 10 mg tablet Take 10 mg by mouth daily.  elvira, Zingiber officinalis, (ELVIRA EXTRACT) 250 mg cap Take 1 Tab by mouth daily.  TURMERIC, BULK, Take 1 Tab by mouth daily.  calcium-cholecalciferol, d3, (CALCIUM 600 + D) 600-125 mg-unit tab Take 2 tablets by mouth daily.  B.infantis-B.ani-B.long-B.bifi (PROBIOTIC 4X) 10-15 mg TbEC Take 1 capsule by mouth daily.  amlodipine-valsartan (EXFORGE)  mg per tablet Take 1 Tab by mouth daily.  metformin (GLUCOPHAGE) 500 mg tablet Take 500 mg by mouth nightly.  triamterene-hydrochlorothiazide (DYAZIDE) 37.5-25 mg per capsule Take 0.5 Caps by mouth daily. Indications: hypertension      levothyroxine (SYNTHROID) 100 mcg tablet Take 100 mcg by mouth daily.  Monday-Saturday only     Maisha Armijo simvastatin (ZOCOR) 40 mg tablet Take 40 mg by mouth nightly.  [DISCONTINUED] raNITIdine (ZANTAC) 150 mg tablet Take 150 mg by mouth two (2) times daily as needed. Past History     Past Medical History:  Past Medical History:   Diagnosis Date    Arthritis     knees    Colon polyp     Diabetes (HCC)     NIDDM    GERD (gastroesophageal reflux disease)     High cholesterol     Hyperlipemia     Hypertension     Hypothyroid     Kidney stones        Past Surgical History:  Past Surgical History:   Procedure Laterality Date    COLONOSCOPY N/A 2018    COL[facility] performed [facility] at [facility] AMBULATORY OR    HX BREAST BIOPSY Right ~2010    cyst removed    HX CARPAL TUNNEL RELEASE Bilateral     HX COLONOSCOPY      HX HEENT      cyst on parathyroid removed    HX HYSTERECTOMY  's    partial hysterectomy    HX KNEE REPLACEMENT Left     Partial Knee Replacement    HX KNEE REPLACEMENT Right 2017       Family History:  Family History   Problem Relation Age of Onset    Heart Disease Mother     Heart Disease Father         MI    Asthma Father     MS Sister     Heart Disease Brother     Anesth Problems Neg Hx        Social History:  Social History     Tobacco Use    Smoking status: Former Smoker     Packs/day: 0.50     Years: 3.00     Pack years: 1.50     Last attempt to quit: 1991     Years since quittin.8    Smokeless tobacco: Never Used   Substance Use Topics    Alcohol use: No    Drug use: No       Allergies:  No Known Allergies      Review of Systems   Review of Systems   Constitutional: Negative for chills, diaphoresis and fever. HENT: Negative for congestion, ear pain, rhinorrhea and sore throat. Respiratory: Negative for cough and shortness of breath. Cardiovascular: Negative for chest pain. Gastrointestinal: Negative for abdominal pain, constipation, diarrhea, nausea and vomiting.    Genitourinary: Negative for difficulty urinating, dysuria, frequency and hematuria. Musculoskeletal: Positive for arthralgias. Negative for myalgias. Neurological: Negative for headaches. All other systems reviewed and are negative. Physical Exam   Physical Exam  Vitals signs and nursing note reviewed. Constitutional:       General: She is not in acute distress. Appearance: She is well-developed. She is not diaphoretic. Comments: 78 y.o. -American female    HENT:      Head: Normocephalic and atraumatic. Eyes:      General:         Right eye: No discharge. Left eye: No discharge. Conjunctiva/sclera: Conjunctivae normal.   Neck:      Musculoskeletal: Normal range of motion and neck supple. Cardiovascular:      Rate and Rhythm: Normal rate and regular rhythm. Pulses: Normal pulses. Heart sounds: Normal heart sounds. No murmur. Pulmonary:      Effort: Pulmonary effort is normal. No respiratory distress. Breath sounds: Normal breath sounds. Musculoskeletal:      Comments: SHOULDER: No swelling, ecchymosis or deformity. Slight TTP to the left scapular region with spasm present. FROM with pain. Distal NV intact. Cap refill < 2 sec. Ambulatory. Skin:     General: Skin is warm and dry. Neurological:      Mental Status: She is alert and oriented to person, place, and time. Psychiatric:         Behavior: Behavior normal.         Diagnostic Study Results     Labs - none    Radiologic Studies -   XR SHOULDER LT AP/LAT MIN 2 V   Final Result   IMPRESSION: No acute abnormality. Medical Decision Making   I am the first provider for this patient. I reviewed the vital signs, available nursing notes, past medical history, past surgical history, family history and social history. Vital Signs-Reviewed the patient's vital signs.   Patient Vitals for the past 12 hrs:   Temp Pulse Resp BP SpO2   10/21/20 1640 98.9 °F (37.2 °C) 76 17 (!) 151/71 100 %       Records Reviewed: Nursing Notes    Provider Notes (Medical Decision Making):   Patient presents the ED with nontraumatic shoulder pain. Differential diagnosis include sprain, strain, arthritis, rotator cuff injury. X-rays are negative for acute issue. Patient provided anti-inflammatories and muscle relaxers. She notes that she already has an appointment scheduled for [place] on 11/3/2020. She will keep that appointment. ED Course:   Initial assessment performed. The patients presenting problems have been discussed, and they are in agreement with the care plan formulated and outlined with them. I have encouraged them to ask questions as they arise throughout their visit. Critical Care Time: None    Disposition:  DISCHARGE NOTE:  6:46 PM  The pt is ready for discharge. The pt's signs, symptoms, diagnosis, and discharge instructions have been discussed and pt has conveyed their understanding. The pt is to follow up as recommended or return to ER should their symptoms worsen. Plan has been discussed and pt is in agreement. PLAN:  1. Current Discharge Medication List      START taking these medications    Details   cyclobenzaprine (FLEXERIL) 10 mg tablet Take 1 Tab by mouth three (3) times daily as needed for Muscle Spasm(s). Qty: 20 Tab, Refills: 0      naproxen (NAPROSYN) 500 mg tablet Take 1 Tab by mouth every twelve (12) hours as needed for Pain. Qty: 20 Tab, Refills: 0         CONTINUE these medications which have NOT CHANGED    Details   lisinopriL (PRINIVIL, ZESTRIL) 10 mg tablet Take 10 mg by mouth daily. ginger, Zingiber officinalis, (GINGER EXTRACT) 250 mg cap Take 1 Tab by mouth daily. TURMERIC, BULK, Take 1 Tab by mouth daily. calcium-cholecalciferol, d3, (CALCIUM 600 + D) 600-125 mg-unit tab Take 2 tablets by mouth daily. B.infantis-B.ani-B.long-B.bifi (PROBIOTIC 4X) 10-15 mg TbEC Take 1 capsule by mouth daily. amlodipine-valsartan (EXFORGE)  mg per tablet Take 1 Tab by mouth daily. metformin (GLUCOPHAGE) 500 mg tablet Take 500 mg by mouth nightly. triamterene-hydrochlorothiazide (DYAZIDE) 37.5-25 mg per capsule Take 0.5 Caps by mouth daily. Indications: hypertension      levothyroxine (SYNTHROID) 100 mcg tablet Take 100 mcg by mouth daily. Monday-Saturday only      simvastatin (ZOCOR) 40 mg tablet Take 40 mg by mouth nightly. 2.   Follow-up Information     Follow up With Specialties Details Why Contact Info    Donis Ulloa MD Orthopedic Surgery In 1 week  [address] [facility]  [address] [address]  [address] [address] [phone]          Return to ED if worse     Diagnosis     Clinical Impression:   1. Acute pain of left shoulder          Please note that this dictation was completed with [employer], the computer voice recognition software. Quite often unanticipated grammatical, syntax, homophones, and other interpretive errors are inadvertently transcribed by the computer software. Please disregards these errors. Please excuse any errors that have escaped final proofreading. This note will not be viewable in [address]

## 2020-10-21 NOTE — DISCHARGE INSTRUCTIONS
Patient Education        Shoulder Pain: Care Instructions  Your Care Instructions     You can hurt your shoulder by using it too much during an activity, such as fishing or baseball. It can also happen as part of the everyday wear and tear of getting older. Shoulder injuries can be slow to heal, but your shoulder should get better with time. Your doctor may recommend a sling to rest your shoulder. If you have injured your shoulder, you may need testing and treatment. Follow-up care is a key part of your treatment and safety. Be sure to make and go to all appointments, and call your doctor if you are having problems. It's also a good idea to know your test results and keep a list of the medicines you take. How can you care for yourself at home? · Take pain medicines exactly as directed. ? If the doctor gave you a prescription medicine for pain, take it as prescribed. ? If you are not taking a prescription pain medicine, ask your doctor if you can take an over-the-counter medicine. ? Do not take two or more pain medicines at the same time unless the doctor told you to. Many pain medicines contain acetaminophen, which is Tylenol. Too much acetaminophen (Tylenol) can be harmful. · If your doctor recommends that you wear a sling, use it as directed. Do not take it off before your doctor tells you to. · Put ice or a cold pack on the sore area for 10 to 20 minutes at a time. Put a thin cloth between the ice and your skin. · If there is no swelling, you can put moist heat, a heating pad, or a warm cloth on your shoulder. Some doctors suggest alternating between hot and cold. · Rest your shoulder for a few days. If your doctor recommends it, you can then begin gentle exercise of the shoulder, but do not lift anything heavy. When should you call for help? Call 911 anytime you think you may need emergency care. For example, call if:    · You have chest pain or pressure.  This may occur with:  ? Sweating. ? Shortness of breath. ? Nausea or vomiting. ? Pain that spreads from the chest to the neck, jaw, or one or both shoulders or arms. ? Dizziness or lightheadedness. ? A fast or uneven pulse. After calling 911, chew 1 adult-strength aspirin. Wait for an ambulance. Do not try to drive yourself.     · Your arm or hand is cool or pale or changes color. Call your doctor now or seek immediate medical care if:    · You have signs of infection, such as:  ? Increased pain, swelling, warmth, or redness in your shoulder. ? Red streaks leading from a place on your shoulder. ? Pus draining from an area of your shoulder. ? Swollen lymph nodes in your neck, armpits, or groin. ? A fever. Watch closely for changes in your health, and be sure to contact your doctor if:    · You cannot use your shoulder.     · Your shoulder does not get better as expected. Where can you learn more? Go to http://www.[employer].com/  Enter H996 in the search box to learn more about \"Shoulder Pain: Care Instructions. \"  Current as of: March 2, 2020               Content Version: 12.6  © 5945-7153 [employer]. Care instructions adapted under license by [employer] (which disclaims liability or warranty for this information). If you have questions about a medical condition or this instruction, always ask your healthcare professional. [address] [place] [address] any warranty or liability for your use of this information.

## 2021-05-12 ENCOUNTER — TRANSCRIBE ORDER (OUTPATIENT)
Dept: SCHEDULING | Age: 71
End: 2021-05-12

## 2021-05-12 DIAGNOSIS — Z12.31 SCREENING MAMMOGRAM FOR HIGH-RISK PATIENT: Primary | ICD-10-CM

## 2021-06-02 ENCOUNTER — HOSPITAL ENCOUNTER (OUTPATIENT)
Dept: MAMMOGRAPHY | Age: 71
Discharge: HOME | End: 2021-06-02
Attending: NURSE PRACTITIONER
Payer: MEDICARE

## 2021-06-02 DIAGNOSIS — Z12.31 SCREENING MAMMOGRAM FOR HIGH-RISK PATIENT: ICD-10-CM

## 2021-06-02 PROCEDURE — 77067 SCR MAMMO BI INCL CAD: CPT

## 2021-08-04 ENCOUNTER — TRANSCRIBE ORDER (OUTPATIENT)
Dept: SCHEDULING | Age: 71
End: 2021-08-04

## 2021-08-04 DIAGNOSIS — N20.0 KIDNEY STONE: ICD-10-CM

## 2021-08-04 DIAGNOSIS — N28.1 RENAL CYST: Primary | ICD-10-CM

## 2021-08-12 ENCOUNTER — HOSPITAL ENCOUNTER (OUTPATIENT)
Dept: ULTRASOUND IMAGING | Age: 71
Discharge: HOME | End: 2021-08-12
Attending: UROLOGY
Payer: MEDICARE

## 2021-08-12 DIAGNOSIS — N28.1 RENAL CYST: ICD-10-CM

## 2021-08-12 DIAGNOSIS — N20.0 KIDNEY STONE: ICD-10-CM

## 2021-08-12 PROCEDURE — 76770 US EXAM ABDO BACK WALL COMP: CPT

## 2021-09-28 NOTE — PERIOP NOTES
Benny is a 7 year old who is being evaluated via a billable telephone visit.      What phone number would you like to be contacted at? [phone]  How would you like to obtain your AVS? Mail a copy    Assessment & Plan     (Y96.479) Suspected COVID-19 virus infection  (primary encounter diagnosis)  Comment:   Plan: Symptomatic COVID-19 Virus (Coronavirus) by PCR            (Z01.369) Exposure to COVID-19 virus  Comment:   Plan: Symptomatic COVID-19 Virus (Coronavirus) by PCR                  Follow Up  Return if symptoms worsen or fail to improve.    Mag Ricks PA-C        Subjective   Benny is a 7 year old who presents for the following health issues     HPI     ENT Symptoms             Symptoms: cc Present Absent Comment   Fever/Chills  x  Low grade 99.9    Fatigue   x    Muscle Aches   x    Eye Irritation   x    Sneezing  x     Nasal [place]le[place]/Drg  x     Sinus Pressure/Pain   x    Loss of smell   x    Dental pain   x    Sore Throat   x    Swollen Glands   x    Ear Pain/Fullness   x    Cough  x  Mild, not constant    Wheeze   x    Chest Pain   x    Shortness of breath   x    Rash   x    Other  x  Headache      Symptom duration:  Yesterday 9/27   Symptom severity:  mild    Treatments tried:  Ibuprofen    Contacts:  Takes the bus to school, someone on his bus tested positive for covid        Started with mild congestion and runny nose yesterday  Brother had similar symptoms which started the day prior  Parents got a letter today that someone on the boy's bus tested positive for COVID - last exposure to positive case was on Friday (9/24)  Both boys are feeling better today        Review of Systems   Remainder of ROS obtained and found to be negative other than that which was documented above        Objective           Vitals:  No vitals were obtained today due to virtual visit.    Physical Exam   No exam completed due to telephone visit.    Diagnostics: COVID test ordered      Phone call duration: 5 minutes     Handoff Report from Operating Room to PACU    Report received from NIHARIKA Bonilla and Alex Lanodn CRNA regarding Kathleen Carrier. Surgeon(s):  Blanka Gupta MD  And Procedure(s) (LRB):  RIGHT TOTAL KNEE ARTHROPLASTY WITH NAVIGATION (Right)  confirmed   with allergies, dressings and local anesthetic discussed. Anesthesia type, drugs, patient history, complications, estimated blood loss, vital signs, intake and output, and last pain medication, lines, reversal medications and temperature were reviewed.

## 2021-10-12 ENCOUNTER — TRANSCRIBE ORDER (OUTPATIENT)
Dept: SCHEDULING | Age: 71
End: 2021-10-12

## 2021-11-18 ENCOUNTER — TRANSCRIBE ORDER (OUTPATIENT)
Dept: SCHEDULING | Age: 71
End: 2021-11-18

## 2021-11-18 DIAGNOSIS — Z12.31 SCREENING MAMMOGRAM FOR HIGH-RISK PATIENT: Primary | ICD-10-CM

## 2021-11-20 ENCOUNTER — TRANSCRIBE ORDER (OUTPATIENT)
Dept: SCHEDULING | Age: 71
End: 2021-11-20

## 2021-11-20 DIAGNOSIS — N64.4 MASTODYNIA: Primary | ICD-10-CM

## 2021-11-22 ENCOUNTER — TRANSCRIBE ORDER (OUTPATIENT)
Dept: SCHEDULING | Age: 71
End: 2021-11-22

## 2021-11-22 DIAGNOSIS — N64.4 MASTODYNIA: Primary | ICD-10-CM

## 2021-12-01 ENCOUNTER — HOSPITAL ENCOUNTER (OUTPATIENT)
Dept: MAMMOGRAPHY | Age: 71
Discharge: HOME | End: 2021-12-01
Attending: OBSTETRICS & GYNECOLOGY
Payer: MEDICARE

## 2021-12-01 DIAGNOSIS — N64.4 MASTODYNIA: ICD-10-CM

## 2021-12-01 PROCEDURE — 77061 BREAST TOMOSYNTHESIS UNI: CPT

## 2022-03-19 PROBLEM — M17.11 OSTEOARTHRITIS OF RIGHT KNEE: Status: ACTIVE | Noted: 2017-06-20

## 2022-05-09 ENCOUNTER — TRANSCRIBE ORDER (OUTPATIENT)
Dept: SCHEDULING | Age: 72
End: 2022-05-09

## 2022-05-09 DIAGNOSIS — Z12.31 ENCOUNTER FOR SCREENING MAMMOGRAM FOR MALIGNANT NEOPLASM OF BREAST: Primary | ICD-10-CM

## 2022-06-08 ENCOUNTER — HOSPITAL ENCOUNTER (OUTPATIENT)
Dept: MAMMOGRAPHY | Age: 72
Discharge: HOME | End: 2022-06-08
Attending: OBSTETRICS & GYNECOLOGY
Payer: MEDICARE

## 2022-06-08 DIAGNOSIS — Z12.31 ENCOUNTER FOR SCREENING MAMMOGRAM FOR MALIGNANT NEOPLASM OF BREAST: ICD-10-CM

## 2022-06-08 PROCEDURE — 77067 SCR MAMMO BI INCL CAD: CPT

## 2023-03-01 ENCOUNTER — TRANSCRIBE ORDER (OUTPATIENT)
Dept: SCHEDULING | Age: 73
End: 2023-03-01

## 2023-03-01 DIAGNOSIS — S46.011A TRAUMATIC COMPLETE TEAR OF RIGHT ROTATOR CUFF, INITIAL ENCOUNTER: Primary | ICD-10-CM

## 2023-03-20 ENCOUNTER — HOSPITAL ENCOUNTER (OUTPATIENT)
Dept: MRI IMAGING | Age: 73
Discharge: HOME | End: 2023-03-20
Attending: ORTHOPAEDIC SURGERY
Payer: MEDICARE

## 2023-03-20 DIAGNOSIS — S46.011A TRAUMATIC COMPLETE TEAR OF RIGHT ROTATOR CUFF, INITIAL ENCOUNTER: ICD-10-CM

## 2023-03-20 PROCEDURE — 73221 MRI JOINT UPR EXTREM W/O DYE: CPT

## 2023-06-09 ENCOUNTER — HOSPITAL ENCOUNTER (OUTPATIENT)
Facility: HOSPITAL | Age: 73
End: 2023-06-09
Payer: MEDICARE

## 2023-06-09 DIAGNOSIS — Z12.31 VISIT FOR SCREENING MAMMOGRAM: ICD-10-CM

## 2023-06-09 PROCEDURE — 77067 SCR MAMMO BI INCL CAD: CPT

## 2023-09-07 ENCOUNTER — HOSPITAL ENCOUNTER (EMERGENCY)
Facility: HOSPITAL | Age: 73
Discharge: HOME | End: 2023-09-07
Attending: EMERGENCY MEDICINE
Payer: MEDICARE

## 2023-09-07 ENCOUNTER — APPOINTMENT (OUTPATIENT)
Facility: HOSPITAL | Age: 73
End: 2023-09-07
Payer: MEDICARE

## 2023-09-07 VITALS
RESPIRATION RATE: 18 BRPM | TEMPERATURE: 98.4 F | HEART RATE: 98 BPM | WEIGHT: 140.5 LBS | HEIGHT: 64 IN | OXYGEN SATURATION: 98 % | DIASTOLIC BLOOD PRESSURE: 68 MMHG | BODY MASS INDEX: 23.99 KG/M2 | SYSTOLIC BLOOD PRESSURE: 148 MMHG

## 2023-09-07 DIAGNOSIS — K52.9 COLITIS: ICD-10-CM

## 2023-09-07 DIAGNOSIS — R10.31 RIGHT LOWER QUADRANT ABDOMINAL PAIN: Primary | ICD-10-CM

## 2023-09-07 LAB
ALBUMIN SERPL-MCNC: 4 G/DL (ref 3.5–5)
ALBUMIN/GLOB SERPL: 1.3 (ref 1.1–2.2)
ALP SERPL-CCNC: 54 U/L (ref 45–117)
ALT SERPL-CCNC: 35 U/L (ref 12–78)
ANION GAP SERPL CALC-SCNC: 6 MMOL/L (ref 5–15)
APPEARANCE UR: CLEAR
AST SERPL-CCNC: 23 U/L (ref 15–37)
BACTERIA URNS QL MICRO: NEGATIVE /HPF
BASOPHILS # BLD: 0.1 K/UL (ref 0–0.1)
BASOPHILS NFR BLD: 0 % (ref 0–1)
BILIRUB SERPL-MCNC: 0.5 MG/DL (ref 0.2–1)
BILIRUB UR QL: NEGATIVE
BUN SERPL-MCNC: 13 MG/DL (ref 6–20)
BUN/CREAT SERPL: 18 (ref 12–20)
CALCIUM SERPL-MCNC: 9.9 MG/DL (ref 8.5–10.1)
CHLORIDE SERPL-SCNC: 107 MMOL/L (ref 97–108)
CO2 SERPL-SCNC: 27 MMOL/L (ref 21–32)
COLOR UR: NORMAL
COMMENT:: NORMAL
CREAT SERPL-MCNC: 0.71 MG/DL (ref 0.55–1.02)
DIFFERENTIAL METHOD BLD: ABNORMAL
EOSINOPHIL # BLD: 0 K/UL (ref 0–0.4)
EOSINOPHIL NFR BLD: 0 % (ref 0–7)
EPITH CASTS URNS QL MICRO: NORMAL /LPF
ERYTHROCYTE [DISTWIDTH] IN BLOOD BY AUTOMATED COUNT: 13.5 % (ref 11.5–14.5)
GLOBULIN SER CALC-MCNC: 3.2 G/DL (ref 2–4)
GLUCOSE SERPL-MCNC: 132 MG/DL (ref 65–100)
GLUCOSE UR STRIP.AUTO-MCNC: NEGATIVE MG/DL
HCT VFR BLD AUTO: 37.4 % (ref 35–47)
HGB BLD-MCNC: 12.2 G/DL (ref 11.5–16)
HGB UR QL STRIP: NEGATIVE
HYALINE CASTS URNS QL MICRO: NORMAL /LPF (ref 0–2)
IMM GRANULOCYTES # BLD AUTO: 0.1 K/UL (ref 0–0.04)
IMM GRANULOCYTES NFR BLD AUTO: 0 % (ref 0–0.5)
KETONES UR QL STRIP.AUTO: NEGATIVE MG/DL
LACTATE BLD-SCNC: 0.88 MMOL/L (ref 0.4–2)
LEUKOCYTE ESTERASE UR QL STRIP.AUTO: NEGATIVE
LIPASE SERPL-CCNC: 219 U/L (ref 73–393)
LYMPHOCYTES # BLD: 1.8 K/UL (ref 0.8–3.5)
LYMPHOCYTES NFR BLD: 13 % (ref 12–49)
MCH RBC QN AUTO: 29.7 PG (ref 26–34)
MCHC RBC AUTO-ENTMCNC: 32.6 G/DL (ref 30–36.5)
MCV RBC AUTO: 91 FL (ref 80–99)
MONOCYTES # BLD: 0.7 K/UL (ref 0–1)
MONOCYTES NFR BLD: 5 % (ref 5–13)
NEUTS SEG # BLD: 10.9 K/UL (ref 1.8–8)
NEUTS SEG NFR BLD: 82 % (ref 32–75)
NITRITE UR QL STRIP.AUTO: NEGATIVE
NRBC # BLD: 0 K/UL (ref 0–0.01)
NRBC BLD-RTO: 0 PER 100 WBC
PH UR STRIP: 5.5 (ref 5–8)
PLATELET # BLD AUTO: 258 K/UL (ref 150–400)
PMV BLD AUTO: 9.2 FL (ref 8.9–12.9)
POTASSIUM SERPL-SCNC: 3.2 MMOL/L (ref 3.5–5.1)
PROT SERPL-MCNC: 7.2 G/DL (ref 6.4–8.2)
PROT UR STRIP-MCNC: NEGATIVE MG/DL
RBC # BLD AUTO: 4.11 M/UL (ref 3.8–5.2)
RBC #/AREA URNS HPF: NORMAL /HPF (ref 0–5)
SODIUM SERPL-SCNC: 140 MMOL/L (ref 136–145)
SP GR UR REFRACTOMETRY: 1.01
SPECIMEN HOLD: NORMAL
URINE CULTURE IF INDICATED: NORMAL
UROBILINOGEN UR QL STRIP.AUTO: 0.2 EU/DL (ref 0.2–1)
WBC # BLD AUTO: 13.6 K/UL (ref 3.6–11)
WBC URNS QL MICRO: NORMAL /HPF (ref 0–4)

## 2023-09-07 PROCEDURE — 36415 COLL VENOUS BLD VENIPUNCTURE: CPT

## 2023-09-07 PROCEDURE — 83690 ASSAY OF LIPASE: CPT

## 2023-09-07 PROCEDURE — 6370000000 HC RX 637 (ALT 250 FOR IP): Performed by: EMERGENCY MEDICINE

## 2023-09-07 PROCEDURE — 83605 ASSAY OF LACTIC ACID: CPT

## 2023-09-07 PROCEDURE — 2580000003 HC RX 258: Performed by: EMERGENCY MEDICINE

## 2023-09-07 PROCEDURE — 6360000004 HC RX CONTRAST MEDICATION: Performed by: RADIOLOGY

## 2023-09-07 PROCEDURE — 81001 URINALYSIS AUTO W/SCOPE: CPT

## 2023-09-07 PROCEDURE — 80053 COMPREHEN METABOLIC PANEL: CPT

## 2023-09-07 PROCEDURE — 74177 CT ABD & PELVIS W/CONTRAST: CPT

## 2023-09-07 PROCEDURE — 85025 COMPLETE CBC W/AUTO DIFF WBC: CPT

## 2023-09-07 PROCEDURE — 99285 EMERGENCY DEPT VISIT HI MDM: CPT

## 2023-09-07 RX ORDER — 0.9 % SODIUM CHLORIDE 0.9 %
500 INTRAVENOUS SOLUTION INTRAVENOUS ONCE
Status: COMPLETED | OUTPATIENT
Start: 2023-09-07 | End: 2023-09-07

## 2023-09-07 RX ORDER — SODIUM CHLORIDE 9 MG/ML
INJECTION, SOLUTION INTRAVENOUS CONTINUOUS
Status: DISCONTINUED | OUTPATIENT
Start: 2023-09-07 | End: 2023-09-07 | Stop reason: HOSPADM

## 2023-09-07 RX ORDER — LEVOFLOXACIN 500 MG/1
500 TABLET, FILM COATED ORAL
Status: COMPLETED | OUTPATIENT
Start: 2023-09-07 | End: 2023-09-07

## 2023-09-07 RX ORDER — LEVOFLOXACIN 500 MG/1
500 TABLET, FILM COATED ORAL DAILY
Qty: 10 TABLET | Refills: 0 | Status: SHIPPED | OUTPATIENT
Start: 2023-09-07 | End: 2023-09-17

## 2023-09-07 RX ORDER — METRONIDAZOLE 250 MG/1
500 TABLET ORAL
Status: COMPLETED | OUTPATIENT
Start: 2023-09-07 | End: 2023-09-07

## 2023-09-07 RX ORDER — HYDROCODONE BITARTRATE AND ACETAMINOPHEN 5; 325 MG/1; MG/1
1 TABLET ORAL EVERY 6 HOURS PRN
Qty: 12 TABLET | Refills: 0 | Status: SHIPPED | OUTPATIENT
Start: 2023-09-07 | End: 2023-09-10

## 2023-09-07 RX ORDER — ONDANSETRON 4 MG/1
4 TABLET, ORALLY DISINTEGRATING ORAL 3 TIMES DAILY PRN
Qty: 21 TABLET | Refills: 0 | Status: SHIPPED | OUTPATIENT
Start: 2023-09-07

## 2023-09-07 RX ORDER — METRONIDAZOLE 500 MG/1
500 TABLET ORAL 3 TIMES DAILY
Qty: 30 TABLET | Refills: 0 | Status: SHIPPED | OUTPATIENT
Start: 2023-09-07 | End: 2023-09-17

## 2023-09-07 RX ADMIN — SODIUM CHLORIDE: 9 INJECTION, SOLUTION INTRAVENOUS at 12:55

## 2023-09-07 RX ADMIN — LEVOFLOXACIN 500 MG: 500 TABLET, FILM COATED ORAL at 15:20

## 2023-09-07 RX ADMIN — METRONIDAZOLE 500 MG: 250 TABLET ORAL at 15:20

## 2023-09-07 RX ADMIN — SODIUM CHLORIDE 500 ML: 9 INJECTION, SOLUTION INTRAVENOUS at 09:46

## 2023-09-07 RX ADMIN — IOPAMIDOL 100 ML: 755 INJECTION, SOLUTION INTRAVENOUS at 11:08

## 2023-09-07 ASSESSMENT — PAIN - FUNCTIONAL ASSESSMENT: PAIN_FUNCTIONAL_ASSESSMENT: NONE - DENIES PAIN

## 2023-09-07 ASSESSMENT — LIFESTYLE VARIABLES
HOW MANY STANDARD DRINKS CONTAINING ALCOHOL DO YOU HAVE ON A TYPICAL DAY: PATIENT DOES NOT DRINK
HOW OFTEN DO YOU HAVE A DRINK CONTAINING ALCOHOL: NEVER

## 2023-09-07 NOTE — ED PROVIDER NOTES
given the following medications:  Medications   0.9 % sodium chloride infusion ( IntraVENous New Bag 9/7/23 1255)   sodium chloride 0.9 % bolus 500 mL (0 mLs IntraVENous Stopped 9/7/23 1147)   iopamidol (ISOVUE-370) 76 % injection 100 mL (100 mLs IntraVENous Given 9/7/23 1108)       Medical Decision Making  Amount and/or Complexity of Data Reviewed  Labs: ordered. Radiology: ordered. Risk  Prescription drug management. Pt with RLQ pain with some urinary symptoms Labs ordered to include a CBC CMP we will obtain urinalysis. Will obtain CT abdomen pelvis with contrast as lower suspicion for kidney stone based off of presentation and exam.    CT does show some mild thickening of the cecum in addition to a fluid-filled appendix no other secondary signs of acute appendicitis. Patient states did have a colonoscopy within the past 30 days with Dr. Jayden Saunders at their outpatient center, she states that she had had a polyp removed but otherwise unremarkable. Will consult general surgery. Patient reexamined patient still having some pain and discomfort mild in severity is tenderness to palpation in the right lower quadrant. Consult note:  Case discussed with general surgery Dr. Mirza Levine, will see patient in consult. Patient seen and evaluated by general surgery. Based off of physical exam as well as CT imaging do not feel the patient's exam is consistent with appendicitis and is most likely colitis isolated to the cecum. Would recommend antibiotics and discharged home in less the patient feels unsafe with that plan. Shared decision making with the patient low suspicion of appendicitis. Discussed with the patient potential for admission for observation overnight with antibiotics or the potential for discharge home with p.o. antibiotics.   Patient would like to go home we will dose with antibiotics here in the emergency department prescriptions written for antibiotics and pain medication with strict

## 2023-09-07 NOTE — DISCHARGE INSTRUCTIONS
You should begin to see symptoms resolve over the next 48 hours. If you are continued to have pain and discomfort you may need to be reevaluated. If you develop any worsening or severe pain or develop nausea and vomiting inability to tolerate meds he should return to the emergency department.

## 2023-12-01 ENCOUNTER — HOSPITAL ENCOUNTER (OUTPATIENT)
Facility: HOSPITAL | Age: 73
End: 2023-12-01
Payer: MEDICARE

## 2023-12-01 VITALS — BODY MASS INDEX: 23.9 KG/M2 | WEIGHT: 140 LBS | HEIGHT: 64 IN

## 2023-12-01 DIAGNOSIS — N64.4 MASTODYNIA: ICD-10-CM

## 2023-12-01 PROCEDURE — G0279 TOMOSYNTHESIS, MAMMO: HCPCS

## 2023-12-01 PROCEDURE — 76642 ULTRASOUND BREAST LIMITED: CPT

## 2024-02-05 ENCOUNTER — HOSPITAL ENCOUNTER (OUTPATIENT)
Facility: HOSPITAL | Age: 74
Setting detail: OUTPATIENT SURGERY
Discharge: HOME | End: 2024-02-05
Attending: INTERNAL MEDICINE | Admitting: INTERNAL MEDICINE
Payer: MEDICARE

## 2024-02-05 ENCOUNTER — ANESTHESIA (OUTPATIENT)
Facility: HOSPITAL | Age: 74
End: 2024-02-05
Payer: MEDICARE

## 2024-02-05 ENCOUNTER — ANESTHESIA EVENT (OUTPATIENT)
Facility: HOSPITAL | Age: 74
End: 2024-02-05
Payer: MEDICARE

## 2024-02-05 VITALS
BODY MASS INDEX: 23.05 KG/M2 | HEIGHT: 64 IN | RESPIRATION RATE: 21 BRPM | OXYGEN SATURATION: 98 % | TEMPERATURE: 98 F | HEART RATE: 65 BPM | SYSTOLIC BLOOD PRESSURE: 133 MMHG | DIASTOLIC BLOOD PRESSURE: 58 MMHG | WEIGHT: 135 LBS

## 2024-02-05 LAB
H. PYLORI FROM TISSUE: NEGATIVE
KIT LOT NO., HCLOLOT: NORMAL
NEGATIVE CONTROL: NEGATIVE
POSITIVE CONTROL: POSITIVE

## 2024-02-05 PROCEDURE — 3600007502: Performed by: INTERNAL MEDICINE

## 2024-02-05 PROCEDURE — 3700000000 HC ANESTHESIA ATTENDED CARE: Performed by: INTERNAL MEDICINE

## 2024-02-05 PROCEDURE — 3600007512: Performed by: INTERNAL MEDICINE

## 2024-02-05 PROCEDURE — 2709999900 HC NON-CHARGEABLE SUPPLY: Performed by: INTERNAL MEDICINE

## 2024-02-05 PROCEDURE — 2580000003 HC RX 258: Performed by: INTERNAL MEDICINE

## 2024-02-05 PROCEDURE — 7100000011 HC PHASE II RECOVERY - ADDTL 15 MIN: Performed by: INTERNAL MEDICINE

## 2024-02-05 PROCEDURE — 7100000010 HC PHASE II RECOVERY - FIRST 15 MIN: Performed by: INTERNAL MEDICINE

## 2024-02-05 PROCEDURE — 3700000001 HC ADD 15 MINUTES (ANESTHESIA): Performed by: INTERNAL MEDICINE

## 2024-02-05 PROCEDURE — 2500000003 HC RX 250 WO HCPCS: Performed by: REGISTERED NURSE

## 2024-02-05 PROCEDURE — 6360000002 HC RX W HCPCS: Performed by: REGISTERED NURSE

## 2024-02-05 RX ORDER — SODIUM CHLORIDE 9 MG/ML
INJECTION, SOLUTION INTRAVENOUS CONTINUOUS
Status: DISCONTINUED | OUTPATIENT
Start: 2024-02-05 | End: 2024-02-05 | Stop reason: HOSPADM

## 2024-02-05 RX ADMIN — PROPOFOL 20 MG: 10 INJECTION, EMULSION INTRAVENOUS at 12:22

## 2024-02-05 RX ADMIN — PROPOFOL 70 MG: 10 INJECTION, EMULSION INTRAVENOUS at 12:16

## 2024-02-05 RX ADMIN — PROPOFOL 30 MG: 10 INJECTION, EMULSION INTRAVENOUS at 12:18

## 2024-02-05 RX ADMIN — PROPOFOL 30 MG: 10 INJECTION, EMULSION INTRAVENOUS at 12:20

## 2024-02-05 RX ADMIN — LIDOCAINE HYDROCHLORIDE 60 MG: 20 INJECTION, SOLUTION EPIDURAL; INFILTRATION; INTRACAUDAL; PERINEURAL at 12:16

## 2024-02-05 RX ADMIN — SODIUM CHLORIDE: 9 INJECTION, SOLUTION INTRAVENOUS at 11:50

## 2024-02-05 ASSESSMENT — PAIN - FUNCTIONAL ASSESSMENT: PAIN_FUNCTIONAL_ASSESSMENT: NONE - DENIES PAIN

## 2024-02-05 NOTE — H&P
History and physical has been reviewed. The patient has been interviewed and examined. There have been no significant clinical changes since the completion of the originally dated History and Physical.    
mild systemic disease (e.g. hypertension)         []     Class III: Patient with severe systemic disease (e.g. CHF), non-decompensated         []     Class IV: Patient with severe systemic disease, decompensated         []     Class V: Moribund patient, survival unlikely                     Plan:   []    Egd                                [x]  Colonoscopy                                [] with Moderate Sedation /Conscious Sedation                                  [x] MAC          Patient stable for planned procedure. See orders.     Jeevan Kunz MD

## 2024-02-05 NOTE — DISCHARGE INSTRUCTIONS
lack of sensation in throat. If present, do not     eat or drink. Before eating, test your ability to drink with small sips of water.  Y     You may try clear liquids or soups. If you tolerate these, you may then eat solid     food which is not greasy or spicy.     __x__ C     IF POLYPS REMOVED: Avoid any blood thinning medication such as plavix,   aspirin or coumadin  NSAIDS (like advil or alleve) for 7 days.            __x__  Notify your physician if you cough or vomit blood or experience chest pain.           Your biopsy or testing result should be available in 7-10 days                                                                                                                      Prescription will be electronically sent to your pharmacy you must     let your nurse know your pharmacy:                                                                                                                                          THE ABOVE INSTRUCTIONS HAVE BEEN EXPLAINED TO ME       TO MY SATISFACTION.  TO HELP ENSURE A SMOOTH RECOVERY,       IT IS IMPORTANT TO FOLLOW THEM.  _x___Pamphlet /Educational Information provided for diagnostic findings     Additional education information can assessed at the sites below:   http://www.[employer]/contents/zyyns-lh-rjrhjotk/patient-information   http://www.digestive.niddk.nih.gov/ddiseases/a-z.asp      Web MD patient information                                                                                                Signature of individual given instructions :   Date: 2/5/2024                                                                                                                                                          Patient Education on Sedation / Analgesia Administered for Procedure      For 24 hours after general anesthesia or intravenous analgesia / sedation:  Have someone responsible help you with your care  Limit your activities  Do not drive and operate

## 2024-02-05 NOTE — ANESTHESIA POSTPROCEDURE EVALUATION
Department of Anesthesiology  Postprocedure Note    Patient: Annita Camacho  MRN: [id number]  YOB: 1950  Date of evaluation: 2/5/2024    Procedure Summary       Date: 02/05/24 Room / Location: [facility] 02 / [facility] ENDOSCOPY    Anesthesia Start: 1211 Anesthesia Stop: 1228    Procedure: EGD ESOPHAGOGASTRODUODENOSCOPY (Upper GI Region) Diagnosis:       Gastroesophageal reflux disease without esophagitis      (Gastroesophageal reflux disease without esophagitis [K21.9])    Surgeons: Jeevan Kunz MD Responsible Provider: Chaka Justice MD    Anesthesia Type: MAC ASA Status: 2            Anesthesia Type: MAC    Letty Phase I: Letty Score: 10    Letty Phase II: Letty Score: 10    Anesthesia Post Evaluation    Patient location during evaluation: PACU  Patient participation: complete - patient participated  Level of consciousness: sleepy but conscious and responsive to verbal stimuli  Pain score: 1  Airway patency: patent  Nausea & Vomiting: no vomiting and no nausea  Cardiovascular status: blood pressure returned to baseline and hemodynamically stable  Respiratory status: acceptable  Hydration status: stable  Multimodal analgesia pain management approach  Pain management: adequate    No notable events documented.

## 2024-02-05 NOTE — PERIOP NOTE
Endoscopy Case End Note:    1225:  Procedure scope was pre-cleaned, per protocol, at bedside by Munira SANCHEZ.      1225:  Report received from anesthesia - Megan MERINO.  See anesthesia flowsheet for intra-procedure vital signs and events.    1226:  Glasses returned to patient.

## 2024-02-05 NOTE — ANESTHESIA PRE PROCEDURE
Nursing notes reviewed  Airway: Mallampati: II       Mouth opening: > = 3 FB   Dental: normal exam         Pulmonary:Negative Pulmonary ROS and normal exam  breath sounds clear to auscultation                             Cardiovascular:Negative CV ROS    (+) hypertension: moderate        Rhythm: regular  Rate: normal                    Neuro/Psych:   Negative Neuro/Psych ROS              GI/Hepatic/Renal: Neg GI/Hepatic/Renal ROS  (+) GERD: well controlled          Endo/Other: Negative Endo/Other ROS   (+) DiabetesType II DM, hypothyroidism::..                 Abdominal:             Vascular: negative vascular ROS.         Other Findings:       Anesthesia Plan      MAC     ASA 2       Induction: intravenous.      Anesthetic plan and risks discussed with patient.      Plan discussed with CRNA.                Chaka Justice MD   2/5/2024

## 2024-02-05 NOTE — OP NOTE
G I Procedure Note                         EGD    Dr. Jeevan Kunz   [place] [facility]   [place] Office              Annita Camacho                              [id number]                                 [id number]   1950                       73 y.o.                female        Procedure Date: 2/5/2024   Procedure  EGD  with biopsy.   [x]  Anesthesia MAC           Pre Op Diagnosis  Indications:                     1. Gastroesophageal reflux disease without esophagitis [K21.9]                                                                                                                                                                          Post Op Diagnosis:                    1.  Hiatal hernia 4 cm mild gastritis                                                                   H&p completed  Yes    Anesthesia Assessment Performed prior to procedure:No change   Medications Medication Record            Description of Procedure:  GÉNESIS  was seen in the endoscopy suite and the pre procedure evaluation was completed.  The patient was identified as Annita Camacho  and the procedure verified as EGD with biopsy.  A Time Out was held and the above information confirmed.    The risks, benefits, complications, treatment options and expected outcomes were discussed with the patient.  The possibilities of reaction to medication, pulmonary aspiration, perforation of a viscus, bleeding, failure to diagnose a condition and creating a complication requiring transfusion or operation were discussed with the patient who  Permit obtained and risks explained ( INCLUDING PERFORATION AND NEED FOR EMERGENCY SURGERY)     Procedure Note:  With the patient in the left lateral position and after appropriate conscious anesthesia the Olympus Video endoscope was passed under direct vision into the oropharynx.  The oropharynx appeared Normal   The

## 2024-06-19 ENCOUNTER — HOSPITAL ENCOUNTER (OUTPATIENT)
Facility: HOSPITAL | Age: 74
Discharge: HOME | End: 2024-06-22
Payer: MEDICARE

## 2024-06-19 DIAGNOSIS — Z12.31 SCREENING MAMMOGRAM FOR BREAST CANCER: ICD-10-CM

## 2024-06-19 DIAGNOSIS — N64.4 MASTODYNIA: ICD-10-CM

## 2024-06-19 PROCEDURE — 77063 BREAST TOMOSYNTHESIS BI: CPT

## 2025-02-28 ENCOUNTER — HOSPITAL ENCOUNTER (OUTPATIENT)
Facility: HOSPITAL | Age: 75
Discharge: HOME | End: 2025-02-28
Payer: MEDICARE

## 2025-02-28 DIAGNOSIS — M25.511 RIGHT SHOULDER PAIN, UNSPECIFIED CHRONICITY: ICD-10-CM

## 2025-02-28 PROCEDURE — 76536 US EXAM OF HEAD AND NECK: CPT

## 2025-05-30 ENCOUNTER — TRANSCRIBE ORDERS (OUTPATIENT)
Facility: HOSPITAL | Age: 75
End: 2025-05-30

## 2025-05-30 DIAGNOSIS — Z12.31 VISIT FOR SCREENING MAMMOGRAM: Primary | ICD-10-CM

## 2025-06-23 ENCOUNTER — HOSPITAL ENCOUNTER (OUTPATIENT)
Facility: HOSPITAL | Age: 75
Discharge: HOME | End: 2025-06-26
Payer: MEDICARE

## 2025-06-23 DIAGNOSIS — Z12.31 VISIT FOR SCREENING MAMMOGRAM: ICD-10-CM

## 2025-06-23 PROCEDURE — 77063 BREAST TOMOSYNTHESIS BI: CPT

## (undated) DEVICE — SYR 50ML LR LCK 1ML GRAD NSAF --

## (undated) DEVICE — DEVON™ KNEE AND BODY STRAP 60" X 3" (1.5 M X 7.6 CM): Brand: DEVON

## (undated) DEVICE — 3M™ IOBAN™ 2 ANTIMICROBIAL INCISE DRAPE 6651EZ: Brand: IOBAN™ 2

## (undated) DEVICE — BASIN EMSIS 16OZ GRAPHITE PLAS KID SHP MOLD GRAD FOR ORAL

## (undated) DEVICE — ZIMMER® STERILE DISPOSABLE TOURNIQUET CUFF WITH PROTECTIVE SLEEVE AND PLC, DUAL PORT, SINGLE BLADDER, 34 IN. (86 CM)

## (undated) DEVICE — BLOCK BITE ENDOSCP AD 21 MM W/ DIL BLU LF DISP

## (undated) DEVICE — BAG SPEC BIOHZRD 10 X 10 IN --

## (undated) DEVICE — STRYKER PERFORMANCE SERIES SAGITTAL BLADE: Brand: STRYKER PERFORMANCE SERIES

## (undated) DEVICE — 1200 GUARD II KIT W/5MM TUBE W/O VAC TUBE: Brand: GUARDIAN

## (undated) DEVICE — (D)SYR 10ML 1/5ML GRAD NSAF -- PKGING CHANGE USE ITEM 338027

## (undated) DEVICE — SOLUTION LACTATED RINGERS INJECTION USP

## (undated) DEVICE — SUTURE VCRL SZ 1 L18IN ABSRB VLT CT-1 L36MM 1/2 CIR J741D

## (undated) DEVICE — SOLIDIFIER MEDC 1200ML -- CONVERT TO 356117

## (undated) DEVICE — SOLUTION IV 1000ML 0.9% SOD CHL

## (undated) DEVICE — HANDLE LT SNAP ON ULT DURABLE LENS FOR TRUMPF ALC DISPOSABLE

## (undated) DEVICE — CONTINU-FLO SOLUTION SET, 2 INJECTION SITES, MALE LUER LOCK ADAPTER WITH RETRACTABLE COLLAR, LARGE BORE STOPCOCK WITH ROTATING MALE LUER LOCK EXTENSION SET, 2 INJECTION SITES, MALE LUER LOCK ADAPTER WITH RETRACTABLE COLLAR: Brand: INTERLINK/CONTINU-FLO

## (undated) DEVICE — HOOD: Brand: FLYTE

## (undated) DEVICE — INFECTION CONTROL KIT SYS

## (undated) DEVICE — CONTAINER SPEC 20 ML LID NEUT BUFF FORMALIN 10 % POLYPR STS

## (undated) DEVICE — SUTURE VCRL SZ 2-0 L18IN ABSRB UD CT-1 L36MM 1/2 CIR J839D

## (undated) DEVICE — ENDO CARRY-ON PROCEDURE KIT INCLUDES ENZYMATIC SPONGE, GAUZE, BIOHAZARD LABEL, TRAY, LUBRICANT, DIRTY SCOPE LABEL, WATER LABEL, TRAY, DRAWSTRING PAD, AND DEFENDO 4-PIECE KIT.: Brand: ENDO CARRY-ON PROCEDURE KIT

## (undated) DEVICE — 3M™ TEGADERM™ TRANSPARENT FILM DRESSING FRAME STYLE, 1624W, 2-3/8 IN X 2-3/4 IN (6 CM X 7 CM), 100/CT 4CT/CASE: Brand: 3M™ TEGADERM™

## (undated) DEVICE — CATHETER IV 24GA L0.75IN OD0.6604-0.7366MM

## (undated) DEVICE — SET ADMIN 16ML TBNG L100IN 2 Y INJ SITE IV PIGGY BK DISP

## (undated) DEVICE — (D)PREP SKN CHLRAPRP APPL 26ML -- CONVERT TO ITEM 371833

## (undated) DEVICE — NEEDLE HYPO 18GA L1.5IN PNK S STL HUB POLYPR SHLD REG BVL

## (undated) DEVICE — HOOK LOCK LATEX FREE ELASTIC BANDAGE D/L 6INX10YD

## (undated) DEVICE — GOWN,SIRUS,NONRNF,SETINSLV,XL,20/CS: Brand: MEDLINE

## (undated) DEVICE — SOLUTION IRRIG 3000ML 0.9% SOD CHL FLX CONT 0797208] ICU MEDICAL INC]

## (undated) DEVICE — KENDALL RADIOLUCENT FOAM MONITORING ELECTRODE RECTANGULAR SHAPE: Brand: KENDALL

## (undated) DEVICE — 3M™ COBAN™ NL STERILE NON-LATEX SELF-ADHERENT WRAP, 2084S, 4 IN X 5 YD (10 CM X 4,5 M), 18 ROLLS/CASE: Brand: 3M™ COBAN™

## (undated) DEVICE — SYR 3ML LL TIP 1/10ML GRAD --

## (undated) DEVICE — NON-REM POLYHESIVE PATIENT RETURN ELECTRODE: Brand: VALLEYLAB

## (undated) DEVICE — SYSTEM SKIN CLSR 22CM DERMBND PRINEO

## (undated) DEVICE — CATHETER IV 18GA L1.16IN OD1.27-1.3462MM ID0.9398-1.016MM

## (undated) DEVICE — Device

## (undated) DEVICE — SOLUTION IRRIG 1000ML H2O STRL BLT

## (undated) DEVICE — Z DISCONTINUED PER MEDLINE LINE GAS SAMPLING O2/CO2 LNG AD 13 FT NSL W/ TBNG FILTERLINE

## (undated) DEVICE — Z DISCONTINUED USE 2717541 SUTURE STRATAFIX SZ 3-0 L30CM NONABSORBABLE UD L26MM FS 3/8

## (undated) DEVICE — SET GRAV CK VLV NEEDLESS ST 3 GANGED 4WAY STPCOCK HI FLO 10

## (undated) DEVICE — SYSTEM REPROC CBL 3 LD DISPOSABLE

## (undated) DEVICE — FORCEPS BX L160CM DIA8MM GRSP DISECT CUP TIP NONLOCKING ROT

## (undated) DEVICE — CATH IV AUTOGRD BC PNK 20GA 25 -- INSYTE

## (undated) DEVICE — REM POLYHESIVE ADULT PATIENT RETURN ELECTRODE: Brand: VALLEYLAB

## (undated) DEVICE — CATHETER IV 20GA L1.16IN OD1.0414-1.1176MM ID0.762-0.8382MM

## (undated) DEVICE — STERILE POLYISOPRENE POWDER-FREE SURGICAL GLOVES: Brand: PROTEXIS

## (undated) DEVICE — INSTRUMENT BATTERY

## (undated) DEVICE — CATHETER IV 22GA L1IN OD0.8382-0.9144MM ID0.6096-0.6858MM 382523

## (undated) DEVICE — MEDI-VAC YANK SUCT HNDL W/TPRD BULBOUS TIP: Brand: CARDINAL HEALTH

## (undated) DEVICE — FCPS BX HOT LG 2.2MMX240CM

## (undated) DEVICE — PLUS HANDPIECE WITH SUCTION TUBING AND TRAUMA TIP WITH SMALL SOFT CONE: Brand: SURGILAV

## (undated) DEVICE — CUSTOM CAST PD STR

## (undated) DEVICE — KENDALL DL ECG CABLE AND LEAD WIRE SYSTEM, 3-LEAD, SINGLE PATIENT USE: Brand: KENDALL

## (undated) DEVICE — SYR 10ML LUER LOK 1/5ML GRAD --

## (undated) DEVICE — KENDALL SCD EXPRESS SLEEVES, KNEE LENGTH, MEDIUM: Brand: KENDALL SCD

## (undated) DEVICE — 3000CC GUARDIAN II: Brand: GUARDIAN

## (undated) DEVICE — NEONATAL-ADULT SPO2 SENSOR: Brand: NELLCOR

## (undated) DEVICE — TOWEL 4 PLY TISS 19X30 SUE WHT

## (undated) DEVICE — T4 HOOD

## (undated) DEVICE — MEDI-VAC SUCTION HIGH CAPACITY: Brand: CARDINAL HEALTH

## (undated) DEVICE — IV START KIT: Brand: MEDLINE